# Patient Record
Sex: FEMALE | Race: WHITE | Employment: OTHER | URBAN - METROPOLITAN AREA
[De-identification: names, ages, dates, MRNs, and addresses within clinical notes are randomized per-mention and may not be internally consistent; named-entity substitution may affect disease eponyms.]

---

## 2019-03-20 ENCOUNTER — HOSPITAL ENCOUNTER (OUTPATIENT)
Age: 69
Setting detail: SPECIMEN
Discharge: HOME | End: 2019-03-20
Payer: MEDICARE

## 2019-03-21 LAB — SURGICAL PATHOLOGY REPORT: NORMAL

## 2019-07-26 ENCOUNTER — HOSPITAL ENCOUNTER (OUTPATIENT)
Facility: MEDICAL CENTER | Age: 69
End: 2019-07-26
Payer: MEDICARE

## 2019-07-26 ENCOUNTER — TELEPHONE (OUTPATIENT)
Dept: ONCOLOGY | Age: 69
End: 2019-07-26

## 2019-07-29 ENCOUNTER — TELEPHONE (OUTPATIENT)
Dept: INFUSION THERAPY | Facility: MEDICAL CENTER | Age: 69
End: 2019-07-29

## 2019-07-30 DIAGNOSIS — Z13.29 ENCOUNTER FOR SCREENING FOR OTHER SUSPECTED ENDOCRINE DISORDER: ICD-10-CM

## 2019-08-02 ENCOUNTER — HOSPITAL ENCOUNTER (OUTPATIENT)
Dept: INFUSION THERAPY | Facility: MEDICAL CENTER | Age: 69
Discharge: HOME | End: 2019-08-02
Payer: MEDICARE

## 2019-08-02 VITALS
DIASTOLIC BLOOD PRESSURE: 77 MMHG | TEMPERATURE: 98.1 F | HEART RATE: 63 BPM | RESPIRATION RATE: 18 BRPM | SYSTOLIC BLOOD PRESSURE: 159 MMHG

## 2019-08-02 DIAGNOSIS — Z13.29 ENCOUNTER FOR SCREENING FOR OTHER SUSPECTED ENDOCRINE DISORDER: Primary | ICD-10-CM

## 2019-08-02 LAB
CORTISOL COLLECTION INFO: 30
CORTISOL: 16.9 UG/DL (ref 2.7–18.4)

## 2019-08-02 PROCEDURE — 2580000003 HC RX 258: Performed by: INTERNAL MEDICINE

## 2019-08-02 PROCEDURE — 36415 COLL VENOUS BLD VENIPUNCTURE: CPT

## 2019-08-02 PROCEDURE — 96375 TX/PRO/DX INJ NEW DRUG ADDON: CPT

## 2019-08-02 PROCEDURE — 82533 TOTAL CORTISOL: CPT

## 2019-08-02 PROCEDURE — 96374 THER/PROPH/DIAG INJ IV PUSH: CPT

## 2019-08-02 PROCEDURE — 6360000002 HC RX W HCPCS: Performed by: INTERNAL MEDICINE

## 2019-08-02 RX ADMIN — COSYNTROPIN 1 MCG: 0.25 INJECTION, POWDER, LYOPHILIZED, FOR SOLUTION INTRAMUSCULAR; INTRAVENOUS at 10:41

## 2021-08-03 ENCOUNTER — TELEPHONE (OUTPATIENT)
Dept: ONCOLOGY | Age: 71
End: 2021-08-03

## 2021-08-06 ENCOUNTER — HOSPITAL ENCOUNTER (OUTPATIENT)
Facility: MEDICAL CENTER | Age: 71
End: 2021-08-06
Payer: MEDICARE

## 2021-08-12 ENCOUNTER — INITIAL CONSULT (OUTPATIENT)
Dept: ONCOLOGY | Age: 71
End: 2021-08-12
Payer: MEDICARE

## 2021-08-12 DIAGNOSIS — Z80.0 FAMILY HISTORY- STOMACH CANCER: ICD-10-CM

## 2021-08-12 DIAGNOSIS — Z80.3 FAMILY HISTORY OF BREAST CANCER: ICD-10-CM

## 2021-08-12 DIAGNOSIS — C50.919 MALIGNANT NEOPLASM OF BREAST IN FEMALE, ESTROGEN RECEPTOR POSITIVE, UNSPECIFIED LATERALITY, UNSPECIFIED SITE OF BREAST (HCC): Primary | ICD-10-CM

## 2021-08-12 DIAGNOSIS — Z17.0 MALIGNANT NEOPLASM OF BREAST IN FEMALE, ESTROGEN RECEPTOR POSITIVE, UNSPECIFIED LATERALITY, UNSPECIFIED SITE OF BREAST (HCC): Primary | ICD-10-CM

## 2021-08-12 PROCEDURE — 96040 PR GENETIC COUNSELING, EACH 30 MIN: CPT | Performed by: GENETIC COUNSELOR, MS

## 2021-08-12 NOTE — PROGRESS NOTES
[facility] [place] [facility]   Hereditary Cancer Risk Assessment     Name: Isaiah Martinez   YOB: 1950   Date of Consultation: 8/12/21     Ms. Elayne Hanson was seen at the [address] [place] [address] for genetic counseling on 8/12/21. Ms. Elayne Hanson was referred by JOSE Duran due to her personal and family history of cancer. PERSONAL HISTORY   Ms. Elayne Hanson is a 65 y.o.  female who was recently diagnosed with breast cancer. 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 She meets with her surgeon this coming Monday to discuss treatment recommendations. FAMILY HISTORY  Ms. Elayne Hanson has one daughter (54y). Ms. Elayne Hanson a total of 3 sisters and 3 brothers. One sister passed away at age 29 from diffuse gastric cancer. Her son (Ms. Maico Bunch nephew) was diagnosed with diffuse gastric cancer at age 52 and is currently undergoing treatment. One sister passed way at age [de-identified] and had a prior history of breast cancer diagnosed at an unknown age. Another sister is living at age 79, no cancer. One brother passed away at age 54 from tongue / esophageal cancer. One brother passed away at age 58 from lung cancer. One brother is living, no cancer; however, his daughter passed away at age 29 from complications of gastrectomy after being diagnosed with diffuse gastric cancer at age 32. Several other distant cousins have had early onset breast and gastric cancer. Copies of her relatives' genetic testing confirm that several members carry a hereditary CDH1 gene mutation which causes hereditary diffuse gastric cancer ([address] [place] [address]). Another relative was also found to carry a hereditary CHEK2 gene mutation. Her daughter does not carry either of these mutations, but it is still unclear if Ms. Elayne Hanson carries them herself. Her daughter's testing also revealed an incidental increased risk allele in the APC gene.      CDH1 c.[id number] - NEGATIVE, FAMILIAL MUTATION NOT DETECTED  CHEK2 c.1100delC - NEGATIVE, FAMILIAL MUTATION NOT DETECTED  APC c.3920T>A ([id number]) - POSITIVE, INCREASED RISK ALLELE DETECTED    RISK ASSESSMENT   We discussed that approximately 5-10% of cancers are due to a hereditary gene mutation which causes an increased risk for certain cancers. Hereditary cancers are typically diagnosed at younger ages (under age 46y) and occur in multiple generations of a family. Multiple individuals with the same type of cancer (example: breast or colorectal) or uncommon cancers (example: ovarian, pancreatic, male breast cancer) are also features of hereditary cancers. We discussed that Ms. Ellsworth's personal history is not highly concerning for a hereditary predisposition given that she was diagnosed with an estrogen receptor positive ductal breast cancer over age 46. However, because she has a strong family history of breast and gastric cancer and that there are 3 known hereditary gene mutations in her family, genetic testing is highly recommended. In summary, Ms. Willy Morrissey meets the [address] [place] [address] (NCCN) guidelines for genetic testing of the CDH1, CHEK2 and APC familial mutations. A copy of her relative's test reports confirm the following mutations:   Nephew - CDH1 c.2064_2065del   Maternal first cousin - CHEK2 c.1100delC  Daughter - APC c.3920T>A ([id number])    The NCCN 2.2022 guidelines state that a multi-gene is often the most efficient and cost effective method of genetic testing. An individual's personal and/or family history may be explained by more than one inherited cancer syndrome. Thus, a multi-gene panel may be more efficient, more cost effecting, and increases the yield of detecting a hereditary mutation which would impact medical management. Given her personal and/or family history, we recommend testing for the following genes at minimum: BRCA1/2, HERMAN, PALB2.     DISCUSSION  We discussed that genetic testing is available for multiple genes related to hereditary cancer. Some of these genes are known to carry a significant increased risk for several cancers including colon, breast, uterine, ovarian, stomach, and pancreatic cancer, while some of these genes are believed to have a moderate increased risk for breast and other cancers. We discussed the possibility of finding a mutation in genes with limited information to guide medical management, as well we as the possibility of identifying variants of uncertain significance (VUS). We discussed the risks, benefits, and limitations of genetic testing. Possible test results were discussed as well as potential screening and prevention strategies. Specifically, we discussed increased breast cancer surveillance by mammogram and breast MRI as well as the option of prophylactic mastectomy. We discussed the recommendation for prophylactic oophorectomy for results which suggest an increased risk for ovarian cancer. Lastly, we discussed that the results of Ms. Ellsworth's genetic testing may be beneficial in defining her risk for cancer as well as for her family members. SUMMARY & PLAN  1) Ms. Javier Gaucher meets the NCCN criteria for genetic testing based on her personal diagnosis of breast cancer and that she has a strong family history of hereditary diffuse gastric cancer caused by a CDH1 gene mutation, a strong family history of breast cancer caused by a CHEK2 gene mutation and that her daughter carries a high risk allele in the APC gene. 2) Genetic testing via a multi-gene panel was recommended and offered to Ms. Ellsworth. 3) Ms. Javier Gaucher elected to proceed with the Multi-Cancer gene panel. 4) Ms. Javier Gaucher is aware that she will receive a notification from [employer] if the out of pocket cost for testing exceeds $100 (based on individual insurance plan) and the option to proceed with the self pay price of $249.     5) Informed consent was obtained and a blood sample was sent to [facility].  We will

## 2021-08-24 ENCOUNTER — TELEPHONE (OUTPATIENT)
Dept: RADIATION ONCOLOGY | Facility: MEDICAL CENTER | Age: 71
End: 2021-08-24

## 2021-08-24 NOTE — TELEPHONE ENCOUNTER
Daughter called to change date of consult. See's Dr Siomara Valverde on 9/20/21 and wants both appointments the same day.  Consult changed to 9/20/21 @ 9:30am

## 2021-09-01 ENCOUNTER — TELEPHONE (OUTPATIENT)
Dept: ONCOLOGY | Age: 71
End: 2021-09-01

## 2021-09-01 NOTE — TELEPHONE ENCOUNTER
Spoke with Law Ernst and Obdul[employer] today and notified them of the following from [facility]: \"Thank you for your order, [id number] Eli Rinaldi, 1950). Unfortunately, the specimen we received for your patient does not meet our quality standards. We apologize for the inconvenience, but we will require a new specimen to proceed with testing. Please review the Specimen Requirements page on our website for additional information. If you and your patient would like to send in a new specimen, we will gladly send you or your patient a new collection kit. If additional services, such as mobile phlebotomy, are needed, please let us know. Please note that a new test order does not need to be generated. We will reach out to the patient directly if we do not hear back from you within one week. \"    Patient is requesting mobile phlebotomy service due to limited ability to get to and from appointments. I notified [facility] of the patient's request and directed them to contact daughter Cristal Camacho to schedule date and time for blood draw. Will contact patient with results when available.

## 2021-09-20 ENCOUNTER — HOSPITAL ENCOUNTER (OUTPATIENT)
Dept: RADIATION ONCOLOGY | Facility: MEDICAL CENTER | Age: 71
Discharge: HOME | End: 2021-09-20
Payer: MEDICARE

## 2021-09-20 ENCOUNTER — TELEPHONE (OUTPATIENT)
Dept: RADIATION ONCOLOGY | Facility: MEDICAL CENTER | Age: 71
End: 2021-09-20

## 2021-09-20 VITALS
TEMPERATURE: 97.1 F | SYSTOLIC BLOOD PRESSURE: 142 MMHG | HEIGHT: 66 IN | RESPIRATION RATE: 16 BRPM | WEIGHT: 210.13 LBS | DIASTOLIC BLOOD PRESSURE: 68 MMHG | BODY MASS INDEX: 33.77 KG/M2 | OXYGEN SATURATION: 96 % | HEART RATE: 69 BPM

## 2021-09-20 DIAGNOSIS — Z17.0 MALIGNANT NEOPLASM OF UPPER-OUTER QUADRANT OF LEFT BREAST IN FEMALE, ESTROGEN RECEPTOR POSITIVE (HCC): Primary | ICD-10-CM

## 2021-09-20 DIAGNOSIS — Z17.0 CARCINOMA OF UPPER-OUTER QUADRANT OF LEFT BREAST IN FEMALE, ESTROGEN RECEPTOR POSITIVE (HCC): ICD-10-CM

## 2021-09-20 DIAGNOSIS — C50.412 MALIGNANT NEOPLASM OF UPPER-OUTER QUADRANT OF LEFT BREAST IN FEMALE, ESTROGEN RECEPTOR POSITIVE (HCC): Primary | ICD-10-CM

## 2021-09-20 DIAGNOSIS — C50.412 CARCINOMA OF UPPER-OUTER QUADRANT OF LEFT BREAST IN FEMALE, ESTROGEN RECEPTOR POSITIVE (HCC): ICD-10-CM

## 2021-09-20 DIAGNOSIS — C50.912 MALIGNANT NEOPLASM OF LEFT FEMALE BREAST, UNSPECIFIED ESTROGEN RECEPTOR STATUS, UNSPECIFIED SITE OF BREAST (HCC): Primary | ICD-10-CM

## 2021-09-20 PROCEDURE — 99213 OFFICE O/P EST LOW 20 MIN: CPT | Performed by: STUDENT IN AN ORGANIZED HEALTH CARE EDUCATION/TRAINING PROGRAM

## 2021-09-20 PROCEDURE — 99205 OFFICE O/P NEW HI 60 MIN: CPT | Performed by: STUDENT IN AN ORGANIZED HEALTH CARE EDUCATION/TRAINING PROGRAM

## 2021-09-20 NOTE — PROGRESS NOTES
[facility] SERVICES Radiation Oncology     [id number]   Martin Chan: [phone]  F: [phone]  [email or web address]          RADIATION ONCOLOGY NEW PATIENT VISIT:    Patient: Aura Hutton   YOB: 1950   MRN:  [id number]     Date of Service: 9/20/2021    Referring Provider for today's consult: Shelly Metzger MD    CHIEF COMPLAINT: \"Breast cancer\"    DIAGNOSIS: Aura Hutton is a 65 y.o. female with left upper outer breast pathological anatomic stage IIA ([id number]) invasive ductal carcinoma, G3, ER+/VA+/HER2-, status post left breast lumpectomy and sentinel lymph node biopsy on 8/31/2021. Final pathology revealed a 4.9 cm invasive ductal carcinoma, G3, negative margins, with DCIS present, intermediate nuclear grade, solid and cribriform pattern with necrosis and microcalcification with negative margins. 0/4 lymph nodes positive for carcinoma. Oncotype score pending. HISTORY OF PRESENT ILLNESS:     This is a very pleasant 73-year-old female who presented with a palpable lesion in the left breast.  She underwent imaging at an outside facility which revealed a 2.5 x 3 x 2.5 cm lesion at the 2 to 3 o'clock position, mid depth on the left breast.  Left breast ultrasound confirmed a 2.9 cm irregular mass at the 2:00 middle depth. She underwent an ultrasound-guided left breast biopsy on 7/27/2021 which revealed invasive ductal carcinoma. She then went on to have a left breast lumpectomy and sentinel lymph node biopsy on 8/31/2021 by Dr. Ramiro Nelson. Final pathology revealed a 4.9 cm invasive ductal carcinoma, G3, negative margins, with DCIS present, intermediate nuclear grade, solid and cribriform pattern with necrosis and microcalcification with negative margins. 0/4 lymph nodes positive for carcinoma. Oncotype score pending.     She met with Dr. Siomara Valverde of the [facility] today (medical oncology), who she has been seen for a number of years for blood clots. Patient was also referred to radiation oncology to discuss postoperative management. Current symptoms:    Patient denies breast or axillary masses,skin changes, nipple discharge or inversion, weight loss, or abnormal bone pain. She does report right axillary tail pain which is new for her. This is how she presented with the left breast mass and she is concerned about this. PRIOR RADIATION HISTORY:  No prior history of radiation therapy    PACEMAKER: None    PAST MEDICAL HISTORY:    Uvaldo Bach  has a past medical history of Cancer ([facility] [address] Hypertension, Kidney stones, Oxygen desaturation during sleep, and Pulmonary embolism ([facility] [address] PAST SURGICAL HISTORY:    Uvaldo Bach  has a past surgical history that includes Cholecystectomy; Rotator cuff repair (Bilateral); Lithotripsy; Hysterectomy; Breast surgery; and joint replacement (Right, 2020). MEDICATIONS:  No current outpatient medications on file prior to encounter. No current facility-administered medications on file prior to encounter. ALLERGIES:   Pcn [penicillins]    SOCIAL HISTORY:  Uvaldo Bach  reports that she has quit smoking. She has never used smokeless tobacco. She reports previous alcohol use. She reports previous drug use. FAMILY HISTORY:  Uvaldo Bach family history includes Cancer in her brother, brother, brother, nephew, nephew, nephew, nephew, niece, niece, sister, and sister. PHYSICAL EXAMINATION:    CHAPERONE: Family/friend/ present  ECO Asymptomatic  BP (!) 142/68   Pulse 69   Temp 97.1 °F (36.2 °C) (Temporal)   Resp 16   Ht 5' 6\" (1.676 m)   Wt 210 lb 2 oz (95.3 kg)   SpO2 96% Comment: uses oxygen at night  BMI 33.92 kg/m²   CONSTITUTIONAL: Well developed, well nourished female. Alert and oriented x3. No acute distress. HEENT: Head normocephalic and atraumatic. Extraocular movements intact. Neck supple, non-tender, without cervical or supraclavicular lymphadenopathy. BREAST: Breasts are symmetric. No palpable masses. Mobile mass at the Postsurgical scar noted in the left breast. Nontender. No skin edema or erythema. No nipple inversion or discharge. AXILLA: Postsurgical changes are noted in the left axilla. No palpable LNs are noted B/L. BACK: Non-tender to palpation or percussion; no CVA tenderness. Axial skeleton non-tender to palpation. NEUROLOGIC EXAM: Cranial nerves II through XII grossly intact. No focal neurologic deficit. Speech is fluent. Gait and posture are steady. PSYCHIATRIC:  Appropriate mood and affect for her clinical situation. ASSESSMENT AND PLAN:     I discussed with the patient regarding her diagnosis and explained the rationale for radiation therapy after breast conservation surgery. I described the logistics of radiation therapy from CT simulation through daily treatments. I also reviewed the potential acute side effects which include, but are not limited to, skin redness, peeling, itching, and ulceration; and late side effects which include, but are not limited to, skin thickening/fibrosis, lung scarring that is unlikely to affect breathing, risk for cardiac toxicity, and a small risk of secondary malignancy. Ms. Deb Farley was given the opportunity to ask questions, and all of her questions were answered to her satisfaction. The patient verbalized understanding. Informed consent for treatment was obtained. She will be scheduled for a CT simulation appointment. I will await final Oncotype score prior to initiating radiation treatment, just in case there is a role for adjuvant chemotherapy. Additionally, as she is complaining of right breast pain, I ordered a repeat mammogram of the right breast.  Lastly, the patient reports that her genetic testing is incomplete and she has been family history of malignancies.   I therefore will refer the patient to our nurse navigators to help her complete the genetic testing, as she has already met with her attending counselor. Orders Placed:     Orders Placed This Encounter   Procedures    [facility] [address] [facility] Oncology Nurse Navigator     + CT simulation    CC:    Mark Dumont MD    Total time spent on this case on the day of encounter is more than 80 minutes. This time includes combination of one or more of the following - review of necessary tests, review of pertinent medical records from the EMR, performing medically appropriate examination and evaluation, counseling and educating the patient/family/caregiver, ordering necessary medical tests, procedures etc., documenting the clinical information in the electronic medical record, care coordination, referring and communicating with other health care providers and interpretation of results independently. This note is created with the assistance of a speech recognition program.  While intending to generate a document that actually reflects the content of the visit, the document can still have some errors including those of syntax and sound a like substitutions which may escape proof reading. It such instances, actual meaning can be extrapolated by contextual diversion.

## 2021-09-20 NOTE — PROGRESS NOTES
Referring Physician: Arya    Pt here for consult for breast. Dr Crow Ba met with today. WIll do oncotype. Patient denies pain. Dr Sarah Muñoz to eval. Right breast mammogram ordered due to pain. Will look for oncotype results prior to Sim from Dr. Hue Lucia office. Teach and sim scheduled. Vitals:    21 0957   BP: (!) 142/68   Pulse: 69   Resp: 16   Temp: 97.1 °F (36.2 °C)   SpO2: 96%    :  Patient Currently in Pain: No             Wt Readings from Last 1 Encounters:   21 210 lb 2 oz (95.3 kg)        Body mass index is 33.92 kg/m². Height: 5' 6\" (167.6 cm)         Immunizations:    Influenza status:    [x]   Current   []   Patient declined    Pneumococcal status:  [x]   Current  []   Patient declined  Covid status:   [x]  Dose #1:                     [x]  Dose #2:               []   Patient declined    Smoking Status:    [] Smoker - PPD:   [x] Nonsmoker - Quit Date:               [] Never a smoker      No chief complaint on file. Cancer Staging  No matching staging information was found for the patient. Prior Radiation Therapy? No   If yes, site treated:   Facility:                             Date:    Concurrent Chemo/radiation? No   If yes, start date:    Prior Chemotherapy? No   If yes    Facility:                             Date:    Prior Hormonal Therapy? No   If yes   Facility:                             Date:    Head and Neck Cancer? No   If yes, please remind physician to place swallow study order and speech therapy order. Pacemaker/Defibulator/ICD:  No             BREAST/GYN  Patient only:     LMP:    Age at first Menses:    Para:    :    Cup size:    Lymphedema Evaluation[de-identified]   [] left arm      [] right arm  Location:     Measurement (cm)    Upper Bicep :    Lower Bicep :           No current outpatient medications on file. No current facility-administered medications for this encounter.        Past Medical History:   Diagnosis Date    Cancer [facility])     Hypertension     Kidney stones     Oxygen desaturation during sleep     Pulmonary embolism (HCC)     orginated in knee traveled to lung       Past Surgical History:   Procedure Laterality Date    BREAST SURGERY      CHOLECYSTECTOMY      HYSTERECTOMY      JOINT REPLACEMENT Right 01/13/2020    LITHOTRIPSY      ROTATOR CUFF REPAIR Bilateral        Family History   Problem Relation Age of Onset    Cancer Sister     Cancer Brother     Cancer Brother     Cancer Brother     Cancer Sister     Cancer Niece     Cancer Nephew     Cancer Niece     Cancer Nephew     Cancer Nephew     Cancer Nephew        Social History     Socioeconomic History    Marital status:      Spouse name: Not on file    Number of children: Not on file    Years of education: Not on file    Highest education level: Not on file   Occupational History    Not on file   Tobacco Use    Smoking status: Former Smoker    Smokeless tobacco: Never Used    Tobacco comment: quit 1991   Substance and Sexual Activity    Alcohol use: Not Currently    Drug use: Not Currently    Sexual activity: Not on file   Other Topics Concern    Not on file   Social History Narrative    Not on file     Social Determinants of Health     Financial Resource Strain:     Difficulty of Paying Living Expenses:    Food Insecurity:     Worried About Running Out of Food in the Last Year:     [address] in the Last Year:    Transportation Needs:     Lack of Transportation (Medical):      Lack of Transportation (Non-Medical):    Physical Activity:     Days of Exercise per Week:     Minutes of Exercise per Session:    Stress:     Feeling of Stress :    Social Connections:     Frequency of Communication with Friends and Family:     Frequency of Social Gatherings with Friends and Family:     Attends Worship Services:     Active Member of Clubs or Organizations:     Attends Club or Organization Meetings:     Marital Status:    Intimate Partner Violence:     Fear of Current or Ex-Partner:     Emotionally Abused:     Physically Abused:     Sexually Abused:              FALLS RISK SCREEN  Instructions:  Assess the patient and enter the appropriate indicators that are present for fall risk identification. Total the numbers entered and assign a fall risk score from Table 2.  Reassess patient at a minimum every 12 weeks or with status change. Assessment   Date  9/20/2021     1. Mental Ability: confusion/cognitively impaired 0     2. Elimination Issues: incontinence, frequency 0       3. Ambulatory: use of assistive devices (walker, cane, off-loading devices),        attached to equipment (IV pole, oxygen) 0     4. Sensory Limitations: dizziness, vertigo, impaired vision 0     5. Age less than 65        0     6. Age 72 or greater 0     7. Medication: diuretics, strong analgesics, hypnotics, sedatives,        antihypertensive agents 0   8. Falls:  recent history of falls within the last 3 months (not to include slipping or        tripping) 0   TOTAL 0    If score of 4 or greater was education given? No       TABLE 2   Risk Score Risk Level Plan of Care   0-3 Little or  No Risk 1. Provide assistance as indicated for ambulation activities  2. Reorient confused/cognitively impaired patient  3. Call-light/bell within patient's reach  4. Chair/bed in low position, stretcher/bed with siderails up except when performing patient care activities  5. Educate patient/family/caregiver on falls prevention  6.  Reassess in 12 weeks or with any noted change in patient condition which places them at a risk for a fall   4-6 Moderate Risk 1. Provide assistance as indicated for ambulation activities  2. Reorient confused/cognitively impaired patient  3. Call-light/bell within patient's reach  4. Chair/bed in low position, stretcher/bed with siderails up except when performing patient care activities  5.   Educate patient/family/caregiver on falls prevention     7 or   Higher High Risk 1. Place patient in easily observable treatment room  2. Patient attended at all times by family member or staff  3. Provide assistance as indicated for ambulation activities  4. Reorient confused/cognitively impaired patient  5. Call-light/bell within patient's reach  6. Chair/bed in low position, stretcher/bed with siderails up except when performing patient care activities  7. Educate patient/family/caregiver on falls prevention             Assessment/Plan: Patient was seen today for consultation.          Yvonne Mclean RN 9/20/2021 10:13 AM

## 2021-09-20 NOTE — TELEPHONE ENCOUNTER
Dr Floyce Sandifer ordered mammogram for [facility]. She wants to schedule it herself at [facility].  Copy of order given to patient

## 2021-09-21 RX ORDER — CITALOPRAM 40 MG/1
40 TABLET ORAL DAILY
COMMUNITY

## 2021-09-21 RX ORDER — DIAZEPAM 5 MG/1
5 TABLET ORAL PRN
COMMUNITY

## 2021-09-21 RX ORDER — LOSARTAN POTASSIUM 50 MG/1
50 TABLET ORAL DAILY
COMMUNITY

## 2021-09-21 RX ORDER — ROPINIROLE 1 MG/1
1 TABLET, FILM COATED ORAL 2 TIMES DAILY
COMMUNITY

## 2021-09-21 RX ORDER — OMEPRAZOLE 20 MG/1
20 CAPSULE, DELAYED RELEASE ORAL 2 TIMES DAILY
COMMUNITY

## 2021-09-21 RX ORDER — GABAPENTIN 300 MG/1
300 CAPSULE ORAL PRN
COMMUNITY

## 2021-09-21 RX ORDER — CHOLECALCIFEROL (VITAMIN D3) 1250 MCG
CAPSULE ORAL PRN
COMMUNITY

## 2021-09-21 RX ORDER — FERROUS SULFATE 325(65) MG
325 TABLET ORAL
COMMUNITY

## 2021-09-21 RX ORDER — AMLODIPINE BESYLATE 5 MG/1
5 TABLET ORAL DAILY
COMMUNITY

## 2021-09-21 NOTE — ADDENDUM NOTE
Encounter addended by: Nakia Jung RN on: 9/21/2021 1:46 PM   Actions taken: Order Reconciliation Section accessed, Home Medications modified, Medication List reviewed, Allergies modified, Allergies reviewed

## 2021-09-23 ENCOUNTER — TELEPHONE (OUTPATIENT)
Dept: ONCOLOGY | Age: 71
End: 2021-09-23

## 2021-09-23 ENCOUNTER — CLINICAL DOCUMENTATION (OUTPATIENT)
Dept: RADIATION ONCOLOGY | Facility: MEDICAL CENTER | Age: 71
End: 2021-09-23

## 2021-09-23 NOTE — PROGRESS NOTES
[facility] Nutrition Note  PG-SGA screening tool reviewed with score of 0    Patient Reports:  1. Weight: Not changed (0)  2. Food Intake: Unchanged? (0) did not answer  3. Symptoms: No problems eating (0)  4. Activities and function: Normal with no limitations? (0) did not answer     *Full assessment for scores > 4. Height: 5' 6\" (1.676 m)  as of 9/20/2021   Current Weight: 210 lb 2 oz (95.3 kg)  as of 9/20/2021  BMI: 33.92 kg/m²    Recommend monitoring patient's weight and for potential side effects from CA itself and/or tx.     Anjali Yeung, 64 [address],   Office Number: [phone]

## 2021-09-23 NOTE — TELEPHONE ENCOUNTER
I reached out to patient to introduce myself. Explained that I am a [facility]. I work primarily with the Breast cancer patients in the oncology departments. I am here as a resource and support to you and your family. I am her to answer questions, help coordinate care and help remove barriers of care. I am here as much or as little as you need me. Please feel free to call me as needed. Left my name and contact information. Let patient know I will be sending out some information about [facility]), navigation, and resources to her in the mail. Explained that I work with Dr. Gaby Escobar the radiation oncology doctor. I explained that we are awaiting the oncotype dx results to know what to do next. I explained if no chemotherapy is indicated we will move forward with radiation therapy. If chemotherapy is recommended we would want to do that next with Dr. Reinaldo Hernandez. We also discussed genetic testing. I let her know that according to my genetic counselors note it looks like the specimen that was sent in was not able to be used and we will need to submit another one. I let her know that I would work with [facility] our genetic counselor and see if this could be drawn at her appointment on 9/30/21 at [facility] radiation oncology. Answered questions. Encouraged and wished patient well. Pt on pending.

## 2021-09-27 DIAGNOSIS — C50.912 MALIGNANT NEOPLASM OF LEFT FEMALE BREAST, UNSPECIFIED ESTROGEN RECEPTOR STATUS, UNSPECIFIED SITE OF BREAST (HCC): ICD-10-CM

## 2021-09-28 ENCOUNTER — TELEPHONE (OUTPATIENT)
Dept: ONCOLOGY | Age: 71
End: 2021-09-28

## 2021-09-28 NOTE — TELEPHONE ENCOUNTER
Assisting Jose Hughes, RN Navigator. Per Mariposa's request I called Dr. Lizbet Contreras office to check on oncotype results. I spoke with Magdiel Orantes and she states that it was just ordered on 9/20/21 so it is not back. Heriberto Alvarez updated.

## 2021-09-30 ENCOUNTER — HOSPITAL ENCOUNTER (OUTPATIENT)
Dept: RADIATION ONCOLOGY | Facility: MEDICAL CENTER | Age: 71
Discharge: HOME | End: 2021-09-30
Attending: RADIOLOGY
Payer: MEDICARE

## 2021-09-30 ENCOUNTER — TELEPHONE (OUTPATIENT)
Dept: ONCOLOGY | Age: 71
End: 2021-09-30

## 2021-09-30 ENCOUNTER — TELEPHONE (OUTPATIENT)
Dept: RADIATION ONCOLOGY | Facility: MEDICAL CENTER | Age: 71
End: 2021-09-30

## 2021-09-30 PROCEDURE — 77334 RADIATION TREATMENT AID(S): CPT | Performed by: STUDENT IN AN ORGANIZED HEALTH CARE EDUCATION/TRAINING PROGRAM

## 2021-09-30 PROCEDURE — 77290 THER RAD SIMULAJ FIELD CPLX: CPT | Performed by: STUDENT IN AN ORGANIZED HEALTH CARE EDUCATION/TRAINING PROGRAM

## 2021-09-30 PROCEDURE — 77263 THER RADIOLOGY TX PLNG CPLX: CPT | Performed by: STUDENT IN AN ORGANIZED HEALTH CARE EDUCATION/TRAINING PROGRAM

## 2021-09-30 NOTE — TELEPHONE ENCOUNTER
Per instructions from Dr Shilpi Charles, I called Dr Poonam Pepper office and spoke with Tiffany to see if Oncotype is back yet. She said No, that it was just ordered on 09/20/2021.

## 2021-09-30 NOTE — TELEPHONE ENCOUNTER
Spoke with Aly Garcia she is working on lab draw for patient for genetics. Unable to do at [facility] onc appointment. Aly Garcia is working with family.

## 2021-09-30 NOTE — PROGRESS NOTES
Pt here today for teach and simulation scan, with her daughter, Kanwal Castellano. Radiation and You information provided along with additional education regarding radiation therapy and what to expect. Pt verbalizes understanding and all questions answered to the best of my knowledge. Samples of aquaphor and community resource information provided. Pt escorted to CT room without any difficulty.

## 2021-10-01 ENCOUNTER — TELEPHONE (OUTPATIENT)
Dept: ONCOLOGY | Age: 71
End: 2021-10-01

## 2021-10-01 NOTE — TELEPHONE ENCOUNTER
Mobile phlebotomy draw from [facility] set up for 10/1 at 1pm at patient's home. Second sample will be shipped by phlebotomy service.

## 2021-10-04 ENCOUNTER — TELEPHONE (OUTPATIENT)
Dept: ONCOLOGY | Age: 71
End: 2021-10-04

## 2021-10-04 NOTE — TELEPHONE ENCOUNTER
Called and left message for Eneida Boyle. Reminded her that I am her nurse navigator at [facility] ([facility]). I work with Dr. Valentino Carolina. I believe that our  spoke with Jacquelyn's daughter today. Gisele Antunez know that I am here to answer questions regarding follow up and treatment. I wanted to check to see if she has an appointment with Dr. Keenan Avelar to discuss the results of her oncotype testing and if chemotherapy was going to be offered or if Dr. Keenan Avelar wants her to go on to radiation therapy. The  notified me that Dr. Brianna Daily is no longer in network with her insurance. I let her know if she would like to get set up with a new surgeon for follow up we can assist her or she can follow with med onc or rad onc. What ever works best for her. I noted oncotype dx testing results in epic. Sim and teach for radiation oncology has been done. Offered to assist patient and daughter as needed. Left my name and number. Pt on pending.

## 2021-10-04 NOTE — TELEPHONE ENCOUNTER
SW returned daughter's call. Daughter explained a few concerns she had surrounding her mother's care. States that the surgeon her mother was seeing at [place] [address] recently went to [facility] and that they do not take her mother's insurance. She states that Dr. Griselda Gallo states that she needs to see patient before she starts radiation treatment. NOHEMI advised will let the nurse navigator know about this concern. SW also provided nurse navigator Mariposa's phone number. Daughter also expressed concerns for patient's mental health. Reports that mom has been having bouts of confusion and severe depression and grief but indicates that doctors have focused on cancer treatment at this time. SW advised that when patient comes in for her first treatment that SW will meet with them to discuss needs. NOHEMI called nurse navigator and updated on call.

## 2021-10-05 ENCOUNTER — HOSPITAL ENCOUNTER (OUTPATIENT)
Dept: RADIATION ONCOLOGY | Facility: MEDICAL CENTER | Age: 71
Discharge: HOME | End: 2021-10-05
Attending: RADIOLOGY
Payer: MEDICARE

## 2021-10-05 PROCEDURE — 77300 RADIATION THERAPY DOSE PLAN: CPT | Performed by: STUDENT IN AN ORGANIZED HEALTH CARE EDUCATION/TRAINING PROGRAM

## 2021-10-05 PROCEDURE — 77334 RADIATION TREATMENT AID(S): CPT | Performed by: STUDENT IN AN ORGANIZED HEALTH CARE EDUCATION/TRAINING PROGRAM

## 2021-10-05 PROCEDURE — 77295 3-D RADIOTHERAPY PLAN: CPT | Performed by: STUDENT IN AN ORGANIZED HEALTH CARE EDUCATION/TRAINING PROGRAM

## 2021-10-06 ENCOUNTER — TELEPHONE (OUTPATIENT)
Dept: ONCOLOGY | Age: 71
End: 2021-10-06

## 2021-10-06 NOTE — TELEPHONE ENCOUNTER
Followed up with patient for psychosocial. Left message encouraging a call back. SW monitoring for first rtx appointment to meet with patient and daughter per previous conversation with daughter.

## 2021-10-12 ENCOUNTER — TELEPHONE (OUTPATIENT)
Dept: ONCOLOGY | Age: 71
End: 2021-10-12

## 2021-10-12 ENCOUNTER — HOSPITAL ENCOUNTER (OUTPATIENT)
Dept: RADIATION ONCOLOGY | Facility: MEDICAL CENTER | Age: 71
Discharge: HOME | End: 2021-10-12
Attending: STUDENT IN AN ORGANIZED HEALTH CARE EDUCATION/TRAINING PROGRAM
Payer: MEDICARE

## 2021-10-12 ENCOUNTER — HOSPITAL ENCOUNTER (OUTPATIENT)
Dept: RADIATION ONCOLOGY | Facility: MEDICAL CENTER | Age: 71
Discharge: HOME | End: 2021-10-12
Attending: RADIOLOGY
Payer: MEDICARE

## 2021-10-12 VITALS
DIASTOLIC BLOOD PRESSURE: 77 MMHG | SYSTOLIC BLOOD PRESSURE: 161 MMHG | WEIGHT: 210.38 LBS | OXYGEN SATURATION: 95 % | BODY MASS INDEX: 33.96 KG/M2 | HEART RATE: 77 BPM | RESPIRATION RATE: 16 BRPM | TEMPERATURE: 97 F

## 2021-10-12 DIAGNOSIS — C50.412 CARCINOMA OF UPPER-OUTER QUADRANT OF LEFT BREAST IN FEMALE, ESTROGEN RECEPTOR POSITIVE (HCC): Primary | ICD-10-CM

## 2021-10-12 DIAGNOSIS — Z17.0 CARCINOMA OF UPPER-OUTER QUADRANT OF LEFT BREAST IN FEMALE, ESTROGEN RECEPTOR POSITIVE (HCC): Primary | ICD-10-CM

## 2021-10-12 PROCEDURE — 77280 THER RAD SIMULAJ FIELD SMPL: CPT | Performed by: STUDENT IN AN ORGANIZED HEALTH CARE EDUCATION/TRAINING PROGRAM

## 2021-10-12 PROCEDURE — 77387 GUIDANCE FOR RADJ TX DLVR: CPT | Performed by: STUDENT IN AN ORGANIZED HEALTH CARE EDUCATION/TRAINING PROGRAM

## 2021-10-12 PROCEDURE — 77412 RADIATION TX DELIVERY LVL 3: CPT | Performed by: STUDENT IN AN ORGANIZED HEALTH CARE EDUCATION/TRAINING PROGRAM

## 2021-10-12 PROCEDURE — G6002 STEREOSCOPIC X-RAY GUIDANCE: HCPCS | Performed by: STUDENT IN AN ORGANIZED HEALTH CARE EDUCATION/TRAINING PROGRAM

## 2021-10-12 NOTE — PROGRESS NOTES
Erin Rides  10/12/2021  Wt Readings from Last 3 Encounters:   10/12/21 210 lb 6 oz (95.4 kg)   09/20/21 210 lb 2 oz (95.3 kg)     Body mass index is 33.96 kg/m². Pt here for OTV. Patient denies any issues. Dr Sandro Leon to eval.    Treatment Area:left breast    Patient was seen today for weekly visit. Comfort Alteration    Fatigue: None    Nutritional Alteration  Anorexia: No   Nausea: No   Vomiting: No     Mucous Membrane Alteration  Drainage: No  Lymphedema: No    Skin Alteration   Sensation:none    Radiation Dermatitis:  Intact [x]     Erythema  []     Discoloration  []     Rash []     Dry desquamation  []     Moist desquamation []       Emotional  Coping: effective      Injury, potential bleeding or infection: none    No results found for: WBC, PLT      BP (!) 161/77   Pulse 77   Temp 97 °F (36.1 °C) (Temporal)   Resp 16   Wt 210 lb 6 oz (95.4 kg)   SpO2 95%   BMI 33.96 kg/m²   Patient Currently in Pain: No                 Assessment/Plan: Patient was seen today for weekly visit.       Jennifer Townsend RN

## 2021-10-12 NOTE — PROGRESS NOTES
Radiation Oncology On-Treatment Visit:     Fraction #  (2.67 Gy)    Diagnosis:  Ms. Galina Madrid is a 66 y.o. female with left upper outer breast pathological anatomic stage IIA ([id number]) invasive ductal carcinoma, G3, ER+/FL+/HER2-, status post left breast lumpectomy and sentinel lymph node biopsy on 2021.     Final pathology revealed a 4.9 cm invasive ductal carcinoma, G3, negative margins, with DCIS present, intermediate nuclear grade, solid and cribriform pattern with necrosis and microcalcification with negative margins. 0/4 lymph nodes positive for carcinoma. Oncotype score 22. Treatment course: Post-operative radiation to the left breast    Progress note:  Ms. Galina Madrid was seen and evaluated. Patient has not noticed any notable side effects of radiation (started treatment today). Physical exam:   VITAL SIGNS: BP (!) 161/77   Pulse 77   Temp 97 °F (36.1 °C) (Temporal)   Resp 16   Wt 210 lb 6 oz (95.4 kg)   SpO2 95%   BMI 33.96 kg/m²   ECO Symptomatic but completely ambulatory   Breast: Deferred. Plan:  · Continue radiation therapy as planned. · Routine skin care advice given to the patient. · I have checked the imaging performed to date, which confirm appropriate positioning.

## 2021-10-12 NOTE — TELEPHONE ENCOUNTER
NOHEMI received consult from [address] [place] [address] stating that patient wants a call regarding a breast surgeon. SW left message encouraging patient to call back. NOHEMI advised will also be at appointment tomorrow to meet with patient.

## 2021-10-13 ENCOUNTER — TELEPHONE (OUTPATIENT)
Dept: ONCOLOGY | Age: 71
End: 2021-10-13

## 2021-10-13 ENCOUNTER — HOSPITAL ENCOUNTER (OUTPATIENT)
Dept: RADIATION ONCOLOGY | Facility: MEDICAL CENTER | Age: 71
Discharge: HOME | End: 2021-10-13
Attending: STUDENT IN AN ORGANIZED HEALTH CARE EDUCATION/TRAINING PROGRAM
Payer: MEDICARE

## 2021-10-13 PROCEDURE — G6002 STEREOSCOPIC X-RAY GUIDANCE: HCPCS | Performed by: STUDENT IN AN ORGANIZED HEALTH CARE EDUCATION/TRAINING PROGRAM

## 2021-10-13 PROCEDURE — 77412 RADIATION TX DELIVERY LVL 3: CPT | Performed by: STUDENT IN AN ORGANIZED HEALTH CARE EDUCATION/TRAINING PROGRAM

## 2021-10-13 PROCEDURE — 77387 GUIDANCE FOR RADJ TX DLVR: CPT | Performed by: STUDENT IN AN ORGANIZED HEALTH CARE EDUCATION/TRAINING PROGRAM

## 2021-10-14 ENCOUNTER — TELEPHONE (OUTPATIENT)
Dept: ONCOLOGY | Age: 71
End: 2021-10-14

## 2021-10-14 ENCOUNTER — HOSPITAL ENCOUNTER (OUTPATIENT)
Dept: RADIATION ONCOLOGY | Facility: MEDICAL CENTER | Age: 71
Discharge: HOME | End: 2021-10-14
Attending: STUDENT IN AN ORGANIZED HEALTH CARE EDUCATION/TRAINING PROGRAM
Payer: MEDICARE

## 2021-10-14 PROCEDURE — 77334 RADIATION TREATMENT AID(S): CPT | Performed by: STUDENT IN AN ORGANIZED HEALTH CARE EDUCATION/TRAINING PROGRAM

## 2021-10-14 PROCEDURE — G6002 STEREOSCOPIC X-RAY GUIDANCE: HCPCS | Performed by: STUDENT IN AN ORGANIZED HEALTH CARE EDUCATION/TRAINING PROGRAM

## 2021-10-14 PROCEDURE — 77300 RADIATION THERAPY DOSE PLAN: CPT | Performed by: STUDENT IN AN ORGANIZED HEALTH CARE EDUCATION/TRAINING PROGRAM

## 2021-10-14 PROCEDURE — 77412 RADIATION TX DELIVERY LVL 3: CPT | Performed by: STUDENT IN AN ORGANIZED HEALTH CARE EDUCATION/TRAINING PROGRAM

## 2021-10-14 PROCEDURE — 77295 3-D RADIOTHERAPY PLAN: CPT | Performed by: STUDENT IN AN ORGANIZED HEALTH CARE EDUCATION/TRAINING PROGRAM

## 2021-10-14 PROCEDURE — 77387 GUIDANCE FOR RADJ TX DLVR: CPT | Performed by: STUDENT IN AN ORGANIZED HEALTH CARE EDUCATION/TRAINING PROGRAM

## 2021-10-14 NOTE — TELEPHONE ENCOUNTER
NOHEMI called patient's daughter to discuss patient driving herself to appointments. SW left message encouraging daughter to call back when she is able. SW received call back from patient's daughter. Advised provider ok with her waiting in the waiting room before driving home. States that on Tuesday she will come in with her mom because patient sees the provider and she wants to present for that. States that patient will drive the daughter to and from the appointment and then patient will provider her own transportation on Wednesday. Daughter concerned about patient being alone in the afternoon as she get weaker with treatment. NOHEMI advised patient is eligible for [employer] which could provider some home health services. States SW can talk to her about it on Tuesday.

## 2021-10-14 NOTE — PROGRESS NOTES
Nutrition Brief: Wt stable    Wt Readings from Last 5 Encounters:   10/12/21 210 lb 6 oz (95.4 kg)   09/20/21 210 lb 2 oz (95.3 kg)        Evaluation:  Wt assessed from 9/20-10/12: stable. No PG-SGA document filled out. No fatigue reported. No change in nutrition alterations. Will reassess next week. Recommendations:  1.  Will continue to monitor change in weights and nutrition alterations      Debi Arauz RD, LD  Office Number: [phone]

## 2021-10-15 ENCOUNTER — HOSPITAL ENCOUNTER (OUTPATIENT)
Dept: RADIATION ONCOLOGY | Facility: MEDICAL CENTER | Age: 71
Discharge: HOME | End: 2021-10-15
Attending: STUDENT IN AN ORGANIZED HEALTH CARE EDUCATION/TRAINING PROGRAM
Payer: MEDICARE

## 2021-10-15 PROCEDURE — 77412 RADIATION TX DELIVERY LVL 3: CPT | Performed by: STUDENT IN AN ORGANIZED HEALTH CARE EDUCATION/TRAINING PROGRAM

## 2021-10-15 PROCEDURE — 77387 GUIDANCE FOR RADJ TX DLVR: CPT | Performed by: STUDENT IN AN ORGANIZED HEALTH CARE EDUCATION/TRAINING PROGRAM

## 2021-10-15 PROCEDURE — G6002 STEREOSCOPIC X-RAY GUIDANCE: HCPCS | Performed by: STUDENT IN AN ORGANIZED HEALTH CARE EDUCATION/TRAINING PROGRAM

## 2021-10-18 ENCOUNTER — HOSPITAL ENCOUNTER (OUTPATIENT)
Dept: RADIATION ONCOLOGY | Facility: MEDICAL CENTER | Age: 71
Discharge: HOME | End: 2021-10-18
Attending: RADIOLOGY
Payer: MEDICARE

## 2021-10-18 PROCEDURE — 77427 RADIATION TX MANAGEMENT X5: CPT | Performed by: STUDENT IN AN ORGANIZED HEALTH CARE EDUCATION/TRAINING PROGRAM

## 2021-10-18 PROCEDURE — G6002 STEREOSCOPIC X-RAY GUIDANCE: HCPCS | Performed by: RADIOLOGY

## 2021-10-18 PROCEDURE — 77387 GUIDANCE FOR RADJ TX DLVR: CPT | Performed by: RADIOLOGY

## 2021-10-18 PROCEDURE — 77412 RADIATION TX DELIVERY LVL 3: CPT | Performed by: RADIOLOGY

## 2021-10-19 ENCOUNTER — HOSPITAL ENCOUNTER (OUTPATIENT)
Dept: RADIATION ONCOLOGY | Facility: MEDICAL CENTER | Age: 71
Discharge: HOME | End: 2021-10-19
Attending: RADIOLOGY
Payer: MEDICARE

## 2021-10-19 VITALS
SYSTOLIC BLOOD PRESSURE: 150 MMHG | WEIGHT: 211 LBS | TEMPERATURE: 97.1 F | DIASTOLIC BLOOD PRESSURE: 67 MMHG | OXYGEN SATURATION: 95 % | RESPIRATION RATE: 15 BRPM | BODY MASS INDEX: 34.06 KG/M2

## 2021-10-19 PROCEDURE — 77336 RADIATION PHYSICS CONSULT: CPT | Performed by: STUDENT IN AN ORGANIZED HEALTH CARE EDUCATION/TRAINING PROGRAM

## 2021-10-19 PROCEDURE — 77387 GUIDANCE FOR RADJ TX DLVR: CPT | Performed by: RADIOLOGY

## 2021-10-19 PROCEDURE — G6002 STEREOSCOPIC X-RAY GUIDANCE: HCPCS | Performed by: RADIOLOGY

## 2021-10-19 PROCEDURE — 77412 RADIATION TX DELIVERY LVL 3: CPT | Performed by: RADIOLOGY

## 2021-10-19 NOTE — PROGRESS NOTES
[address] [facility], [address] [place] [address]  Fax [phone] [address] [facility] WEEKLY PROGRESS NOTE  Patient ID:   Latanya Mendez  : 1950   MRN: [id number]    DIAGNOSIS:  Cancer Staging  No matching staging information was found for the patient. TREATMENT DETAILS:  Treatment Site: left breast  Actual Dose: 1335cGy  Total Planned Dose: 4005cGy  Treatment Technique: 3D-CRT  Fraction Technique: Daily  Therapy imaging monitoring: KV match  Concurrent Chemotherapy: none    SUBJECTIVE:   Patient seen for their weekly on treatment evaluation today. Some fatigue above baseline but no major skin irritation. Dealing with a lot of things outside of radiation for her breast. Some nausea past day or so but doesn't want any medication for it. OBJECTIVE:   CHAPERONE: Not Required    ECO Asymptomatic    VITAL SIGNS: There were no vitals taken for this visit. Wt Readings from Last 5 Encounters:   10/12/21 210 lb 6 oz (95.4 kg)   21 210 lb 2 oz (95.3 kg)     GENERAL:  General appearance is that of a well-nourished, well-developed in no apparent distress. HEENT: Normocephalic, atraumatic, EOMI, moist mucosa, no erythema. Indirect exam .  NECK:  No adenopathy or a palpable thyroid mass, trachea is midline. LYMPHATICS: No cervical, supraclavicular, or axillary adenopathy. HEART:  Normal rate and regular rhythm, normal heart sounds. LUNGS:  Pulmonary effort normal. Normal breath sounds. ABDOMEN:  Soft, nontender, non distended, and no hepatosplenomegaly. EXTREMITIES:  No edema. No calf tenderness. MSK:  No spinal tenderness. Normal ROM. NEUROLOGICAL: Alert and oriented. Strength and sensation intact bilaterally. No focal deficits. PSYCH: Mood normal, behavior normal.  SKIN: No erythema, no desquamation.     LABS:  No results found for: WBC, NEUTROABS, HGB, PLT, CREATININE  No results found for: , CEA  No results found for: PSA    MEDICATIONS:    Current Outpatient Medications:     amLODIPine (NORVASC) 5 MG tablet, Take 5 mg by mouth daily, Disp: , Rfl:     citalopram (CELEXA) 40 MG tablet, Take 40 mg by mouth daily, Disp: , Rfl:     apixaban (ELIQUIS) 5 MG TABS tablet, Take 5 mg by mouth 2 times daily, Disp: , Rfl:     ferrous sulfate (IRON 325) 325 (65 Fe) MG tablet, Take 325 mg by mouth daily (with breakfast), Disp: , Rfl:     losartan (COZAAR) 50 MG tablet, Take 50 mg by mouth daily, Disp: , Rfl:     omeprazole (PRILOSEC) 20 MG delayed release capsule, Take 20 mg by mouth as needed, Disp: , Rfl:     rOPINIRole (REQUIP) 1 MG tablet, Take 1 mg by mouth 2 times daily, Disp: , Rfl:     diazePAM (VALIUM) 5 MG tablet, Take 5 mg by mouth as needed for Anxiety. Indications: 60 mins prior to injection therapy, Disp: , Rfl:     gabapentin (NEURONTIN) 300 MG capsule, Take 300 mg by mouth as needed. , Disp: , Rfl:     Cholecalciferol (VITAMIN D3) 1.25 MG (22443 UT) CAPS, Take by mouth as needed, Disp: , Rfl:       ASSESSMENT PLAN:     Continue radiation. Treatment setup and plan reviewed. Port images/CBCT images reviewed. Appropriate laboratory work was reviewed. Treatment side effects and toxicities reviewed with the patient, and appropriate management was advised. Will continue radiation treatment as planned, and recommend patient contact us if they have any questions or concerns. Electronically signed by Job Oconnor MD on 10/19/2021 at 8:31 AM      Drugs Prescribed:  New Prescriptions    No medications on file       Other Orders Placed:  No orders of the defined types were placed in this encounter.

## 2021-10-19 NOTE — PROGRESS NOTES
Maikel Sterling  10/19/2021  Wt Readings from Last 3 Encounters:   10/19/21 211 lb (95.7 kg)   10/12/21 210 lb 6 oz (95.4 kg)   09/20/21 210 lb 2 oz (95.3 kg)     Body mass index is 34.06 kg/m². PT here for OTV. Patient states some fatigue and last two to three days mild nausea. Dr Anil Pacheco to [facility].    Treatment Area:left breast    Patient was seen today for weekly visit. Comfort Alteration    Fatigue: Mild    Nutritional Alteration  Anorexia: yes   Nausea: yes  Vomiting: No     Mucous Membrane Alteration  Drainage: No  Lymphedema: No    Skin Alteration   Sensation:none    Radiation Dermatitis:  Intact [x]     Erythema  []     Discoloration  []     Rash []     Dry desquamation  []     Moist desquamation []       Emotional  Coping: effective      Injury, potential bleeding or infection: none    No results found for: WBC, PLT      BP (!) 150/67   Temp 97.1 °F (36.2 °C) (Temporal)   Resp 15   Wt 211 lb (95.7 kg)   SpO2 95%   BMI 34.06 kg/m²   Patient Currently in Pain: No                 Assessment/Plan: Patient was seen today for weekly visit.       Emilie Hannah RN

## 2021-10-20 ENCOUNTER — HOSPITAL ENCOUNTER (OUTPATIENT)
Dept: RADIATION ONCOLOGY | Facility: MEDICAL CENTER | Age: 71
Discharge: HOME | End: 2021-10-20
Attending: RADIOLOGY
Payer: MEDICARE

## 2021-10-20 ENCOUNTER — TELEPHONE (OUTPATIENT)
Dept: ONCOLOGY | Age: 71
End: 2021-10-20

## 2021-10-20 PROCEDURE — 77412 RADIATION TX DELIVERY LVL 3: CPT | Performed by: RADIOLOGY

## 2021-10-20 PROCEDURE — 77387 GUIDANCE FOR RADJ TX DLVR: CPT | Performed by: RADIOLOGY

## 2021-10-20 PROCEDURE — G6002 STEREOSCOPIC X-RAY GUIDANCE: HCPCS | Performed by: RADIOLOGY

## 2021-10-20 NOTE — TELEPHONE ENCOUNTER
RECEIVED VM FROM Juan Pablo Grant, [address] @ 1 [phone]. [facility] IS REQUESTING THE AGE PT WAS AT TIME OF DX AND FAMILY HISTORY OF CANCER INFORMATION R/T COVERAGE FOR BRACA TESTING THAT WAS DONE. FAXED GENETICS NOTE TO 1 [phone] W/ CONFIRMATION.

## 2021-10-21 ENCOUNTER — HOSPITAL ENCOUNTER (OUTPATIENT)
Dept: RADIATION ONCOLOGY | Facility: MEDICAL CENTER | Age: 71
Discharge: HOME | End: 2021-10-21
Attending: RADIOLOGY
Payer: MEDICARE

## 2021-10-21 PROCEDURE — G6002 STEREOSCOPIC X-RAY GUIDANCE: HCPCS | Performed by: RADIOLOGY

## 2021-10-21 PROCEDURE — 77412 RADIATION TX DELIVERY LVL 3: CPT | Performed by: RADIOLOGY

## 2021-10-21 PROCEDURE — 77387 GUIDANCE FOR RADJ TX DLVR: CPT | Performed by: RADIOLOGY

## 2021-10-21 NOTE — PROGRESS NOTES
Nutrition Brief: Wt loss trigger    Wt Readings from Last 5 Encounters:   10/19/21 211 lb (95.7 kg)   10/12/21 210 lb 6 oz (95.4 kg)   09/20/21 210 lb 2 oz (95.3 kg)        Evaluation:  Wts assessed 10/12 to 10/19: wt stable  Mild fatigue reported. Reports anorexia and nausea. Patient seen regarding change in nutr alterations- she reports eating \"too much\". States she feels nauseous if she does not have food in her stomach. She has nausea all the time. She is starting to notice loss of taste. Provided education on Nausea and vomiting nutrition therapy, poor appetite, and encouraged protein intake and boost or ensure as needed. Will reassess next week. Recommendations:  1.  Will continue to monitor change in weights and nutrition alterations      Junior Evin RD, FANI  Office Number: [phone]

## 2021-10-22 ENCOUNTER — HOSPITAL ENCOUNTER (OUTPATIENT)
Dept: RADIATION ONCOLOGY | Facility: MEDICAL CENTER | Age: 71
Discharge: HOME | End: 2021-10-22
Attending: RADIOLOGY
Payer: MEDICARE

## 2021-10-22 PROCEDURE — G6002 STEREOSCOPIC X-RAY GUIDANCE: HCPCS | Performed by: RADIOLOGY

## 2021-10-22 PROCEDURE — 77412 RADIATION TX DELIVERY LVL 3: CPT | Performed by: RADIOLOGY

## 2021-10-22 PROCEDURE — 77387 GUIDANCE FOR RADJ TX DLVR: CPT | Performed by: RADIOLOGY

## 2021-10-25 ENCOUNTER — HOSPITAL ENCOUNTER (OUTPATIENT)
Dept: RADIATION ONCOLOGY | Facility: MEDICAL CENTER | Age: 71
Discharge: HOME | End: 2021-10-25
Attending: STUDENT IN AN ORGANIZED HEALTH CARE EDUCATION/TRAINING PROGRAM
Payer: MEDICARE

## 2021-10-25 PROCEDURE — 77427 RADIATION TX MANAGEMENT X5: CPT | Performed by: RADIOLOGY

## 2021-10-25 PROCEDURE — 77412 RADIATION TX DELIVERY LVL 3: CPT | Performed by: STUDENT IN AN ORGANIZED HEALTH CARE EDUCATION/TRAINING PROGRAM

## 2021-10-25 PROCEDURE — 77387 GUIDANCE FOR RADJ TX DLVR: CPT | Performed by: STUDENT IN AN ORGANIZED HEALTH CARE EDUCATION/TRAINING PROGRAM

## 2021-10-25 PROCEDURE — G6002 STEREOSCOPIC X-RAY GUIDANCE: HCPCS | Performed by: STUDENT IN AN ORGANIZED HEALTH CARE EDUCATION/TRAINING PROGRAM

## 2021-10-26 ENCOUNTER — HOSPITAL ENCOUNTER (OUTPATIENT)
Dept: RADIATION ONCOLOGY | Facility: MEDICAL CENTER | Age: 71
Discharge: HOME | End: 2021-10-26
Attending: RADIOLOGY
Payer: MEDICARE

## 2021-10-26 ENCOUNTER — HOSPITAL ENCOUNTER (OUTPATIENT)
Dept: RADIATION ONCOLOGY | Facility: MEDICAL CENTER | Age: 71
Discharge: HOME | End: 2021-10-26
Attending: STUDENT IN AN ORGANIZED HEALTH CARE EDUCATION/TRAINING PROGRAM
Payer: MEDICARE

## 2021-10-26 VITALS
RESPIRATION RATE: 16 BRPM | OXYGEN SATURATION: 94 % | SYSTOLIC BLOOD PRESSURE: 129 MMHG | DIASTOLIC BLOOD PRESSURE: 61 MMHG | BODY MASS INDEX: 34.14 KG/M2 | HEART RATE: 69 BPM | WEIGHT: 211.5 LBS | TEMPERATURE: 96.6 F

## 2021-10-26 DIAGNOSIS — C50.412 CARCINOMA OF UPPER-OUTER QUADRANT OF LEFT BREAST IN FEMALE, ESTROGEN RECEPTOR POSITIVE (HCC): Primary | ICD-10-CM

## 2021-10-26 DIAGNOSIS — Z17.0 CARCINOMA OF UPPER-OUTER QUADRANT OF LEFT BREAST IN FEMALE, ESTROGEN RECEPTOR POSITIVE (HCC): Primary | ICD-10-CM

## 2021-10-26 PROCEDURE — 77336 RADIATION PHYSICS CONSULT: CPT | Performed by: RADIOLOGY

## 2021-10-26 PROCEDURE — 77412 RADIATION TX DELIVERY LVL 3: CPT | Performed by: STUDENT IN AN ORGANIZED HEALTH CARE EDUCATION/TRAINING PROGRAM

## 2021-10-26 PROCEDURE — 77417 THER RADIOLOGY PORT IMAGE(S): CPT | Performed by: STUDENT IN AN ORGANIZED HEALTH CARE EDUCATION/TRAINING PROGRAM

## 2021-10-26 NOTE — PROGRESS NOTES
Radiation Oncology On-Treatment Visit:     Fraction # 11  (29.37 Gy)    Diagnosis:  Ms. Ifeanyi Morris is a 66 y.o. female with left upper outer breast pathological anatomic stage IIA ([id number]) invasive ductal carcinoma, G3, ER+/NE+/HER2-, status post left breast lumpectomy and sentinel lymph node biopsy on 2021.     Final pathology revealed a 4.9 cm invasive ductal carcinoma, G3, negative margins, with DCIS present, intermediate nuclear grade, solid and cribriform pattern with necrosis and microcalcification with negative margins. 0/4 lymph nodes positive for carcinoma. Oncotype score 22. Treatment course: Post-operative radiation to the left breast    Progress note:  Ms. Ifeanyi Morris was seen and evaluated. Patient is moisturizing the skin over her breast. She has minimal fatigue, no complaints. Physical exam:   VITAL SIGNS: /61   Pulse 69   Temp 96.6 °F (35.9 °C) (Temporal)   Resp 16   Wt 211 lb 8 oz (95.9 kg)   SpO2 94%   BMI 34.14 kg/m²   ECO Symptomatic but completely ambulatory   Breast: Mild erythema over left breast, no desquamation. Plan:  · Continue radiation therapy as planned. · I have checked the imaging performed to date, which confirm appropriate positioning.

## 2021-10-26 NOTE — PROGRESS NOTES
Monica Bal  10/26/2021  Wt Readings from Last 3 Encounters:   10/26/21 211 lb 8 oz (95.9 kg)   10/19/21 211 lb (95.7 kg)   10/12/21 210 lb 6 oz (95.4 kg)     Body mass index is 34.14 kg/m². PT here for OTV. Patient denies any new problems. Dr Suleman Henson to [facility].    Treatment Area:breast    Patient was seen today for weekly visit. Comfort Alteration    Fatigue: Mild    Nutritional Alteration  Anorexia: No   Nausea: No   Vomiting: No     Mucous Membrane Alteration  Drainage: No  Lymphedema: No    Skin Alteration   Sensation:none    Radiation Dermatitis:  Intact [x]     Erythema  []     Discoloration  []     Rash []     Dry desquamation  []     Moist desquamation []       Emotional  Coping: effective      Injury, potential bleeding or infection: none    No results found for: WBC, PLT      /61   Pulse 69   Temp 96.6 °F (35.9 °C) (Temporal)   Resp 16   Wt 211 lb 8 oz (95.9 kg)   SpO2 94%   BMI 34.14 kg/m²   Patient Currently in Pain: No                 Assessment/Plan: Patient was seen today for weekly visit.       Lenka Aquino RN

## 2021-10-27 ENCOUNTER — HOSPITAL ENCOUNTER (OUTPATIENT)
Dept: RADIATION ONCOLOGY | Facility: MEDICAL CENTER | Age: 71
Discharge: HOME | End: 2021-10-27
Attending: STUDENT IN AN ORGANIZED HEALTH CARE EDUCATION/TRAINING PROGRAM
Payer: MEDICARE

## 2021-10-27 PROCEDURE — 77412 RADIATION TX DELIVERY LVL 3: CPT | Performed by: STUDENT IN AN ORGANIZED HEALTH CARE EDUCATION/TRAINING PROGRAM

## 2021-10-27 PROCEDURE — 77387 GUIDANCE FOR RADJ TX DLVR: CPT | Performed by: STUDENT IN AN ORGANIZED HEALTH CARE EDUCATION/TRAINING PROGRAM

## 2021-10-27 PROCEDURE — G6002 STEREOSCOPIC X-RAY GUIDANCE: HCPCS | Performed by: STUDENT IN AN ORGANIZED HEALTH CARE EDUCATION/TRAINING PROGRAM

## 2021-10-28 ENCOUNTER — HOSPITAL ENCOUNTER (OUTPATIENT)
Dept: RADIATION ONCOLOGY | Facility: MEDICAL CENTER | Age: 71
Discharge: HOME | End: 2021-10-28
Attending: STUDENT IN AN ORGANIZED HEALTH CARE EDUCATION/TRAINING PROGRAM
Payer: MEDICARE

## 2021-10-28 PROCEDURE — G6002 STEREOSCOPIC X-RAY GUIDANCE: HCPCS | Performed by: STUDENT IN AN ORGANIZED HEALTH CARE EDUCATION/TRAINING PROGRAM

## 2021-10-28 PROCEDURE — 77387 GUIDANCE FOR RADJ TX DLVR: CPT | Performed by: STUDENT IN AN ORGANIZED HEALTH CARE EDUCATION/TRAINING PROGRAM

## 2021-10-28 PROCEDURE — 77412 RADIATION TX DELIVERY LVL 3: CPT | Performed by: STUDENT IN AN ORGANIZED HEALTH CARE EDUCATION/TRAINING PROGRAM

## 2021-10-28 NOTE — PROGRESS NOTES
Nutrition Brief: Wt stable    Wt Readings from Last 5 Encounters:   10/26/21 211 lb 8 oz (95.9 kg)   10/19/21 211 lb (95.7 kg)   10/12/21 210 lb 6 oz (95.4 kg)   09/20/21 210 lb 2 oz (95.3 kg)        Evaluation:  Wts assessed 10/19 to 10/26: weight stable  Mild fatigue reported. No change in nutrition alterations. No N/V  Previously educated: N/V Nutrition Therapy, Poor Appetite, and ONS recommendations    Recommendations:  1.  Will continue to monitor weights and change in nutrition alterations      Shahnaz Cole RD, LD  Office Number: [phone]

## 2021-10-29 ENCOUNTER — HOSPITAL ENCOUNTER (OUTPATIENT)
Dept: RADIATION ONCOLOGY | Facility: MEDICAL CENTER | Age: 71
Discharge: HOME | End: 2021-10-29
Attending: STUDENT IN AN ORGANIZED HEALTH CARE EDUCATION/TRAINING PROGRAM
Payer: MEDICARE

## 2021-10-29 ENCOUNTER — TELEPHONE (OUTPATIENT)
Dept: ONCOLOGY | Age: 71
End: 2021-10-29

## 2021-10-29 PROCEDURE — 77412 RADIATION TX DELIVERY LVL 3: CPT | Performed by: STUDENT IN AN ORGANIZED HEALTH CARE EDUCATION/TRAINING PROGRAM

## 2021-10-29 PROCEDURE — 77387 GUIDANCE FOR RADJ TX DLVR: CPT | Performed by: STUDENT IN AN ORGANIZED HEALTH CARE EDUCATION/TRAINING PROGRAM

## 2021-10-29 PROCEDURE — G6002 STEREOSCOPIC X-RAY GUIDANCE: HCPCS | Performed by: STUDENT IN AN ORGANIZED HEALTH CARE EDUCATION/TRAINING PROGRAM

## 2021-10-29 NOTE — TELEPHONE ENCOUNTER
3 [facility]   Hereditary Cancer Risk Assessment     Name: Gabriela Monaco  YOB: 1950  Date of Results Disclosure: 10/29/21     HISTORY   Ms. Trace Patrciia was seen for genetic counseling at the request of Lily Crigler, APRN-CNP due to her personal and family history of cancer. At that time, Ms. Trace Patricia chose to pursue genetic testing via the Multi-Cancer gene panel. These results were discussed with Ms. Trace Patricia via telephone. A summary of Ms. Ellsworth's results and recommendations are below. RESULTS  [facility] Multi-Cancer Panel: NEGATIVE - NO CLINICALLY SIGNIFICANT MUTATIONS DETECTED   This panel included the analysis of 84 genes associated with hereditary cancer including: AIP, ALK, APC, HERMAN, AXIN2, BAP1, BARD1, BLM, BMPR1A, BRCA1, BRCA2, BRIP1, CASR, CDC73, CDH1, CDK4, CDKN1B, CDKN1C, CDKN2A, CEBPA, CHEK2, CTNNA1, DICER1, DIS3L2, EGFR, EPCAM, FH, FLCN, GATA2, GPC3, GREM1, HOXB13, HRAS, KIT, MAX, MEN1, MET, MITF, MLH1, MSH2, MSH3, MSH6, MUTYH, NBN, NF1, NF2, NTHL1, PALB2, PDGFRA, PHOX2B, PMS2, POLD1, POLE, POT1, QDIED1M, PTCH1, PTEN, RAD50, RAD51C, RAD51D, RB1, RECQL4, RET, RUNX1, SDHA, SDHAF2, SDHB, SDHC, SDHD, SMAD4, SMARCA4, SMARCB1, SMARCE1, STK11, SUFU, TERC, TERT, OKGD391,  TP53, TSC1, TSC2, VHL, WRN, WT1. Please refer to genetic test report for technical details. We discussed that Ms. Ellsworth's result confirms that she does not carry the familial mutations previously detected in her family. This includes the [id number] mutation in the CDH1 gene and the c.1100delC mutation in the CHEK2 gene. She also does not carry the c.3920T>A increased risk allele with the APC gene. Her result also confirms that she does not carry any other hereditary gene mutations in the other genes analyzed. This greatly reduces the likelihood that her personal history of cancer is due to a hereditary mutation.   It is possible that her personal history of cancer may be due to a gene for which testing was not performed or which has yet to be discovered. RECOMMENDATIONS  1) The outcome of Ms. Ellsworth's genetic test results do not affect her current cancer treatment. Ms. Elizabeth Snyder should continue cancer treatment and surveillance as directed by her physicians. 2) Ms. Elizabeth Snyder should continue general population cancer screening guidelines as directed by her physicians. RECOMMENDATIONS FOR FAMILY MEMBERS   1) Genetic testing for Ms. Ellsworth's remaining relatives is still recommended to confirm their carrier status for the known CDH1 and CHEK2 gene mutations which have been identified in her family. SUMMARY & PLAN   1) Ms. Reggie Whitt genetic test results are negative meaning there were no clinically significant mutations detected in the 83 genes analyzed. She does not carry the familial CDH1 or CHEK2 gene mutations which have been previously identified in her family. 2) There are no changes in medical management for Ms. Ellsworth based on her negative genetic test results. 3) We encourage Ms. Ellsworth to contact us every 1-2 years to determine if there are any new genetic testing or research options available. 4) We encourage Ms. Ellsworth to contact us with updates to her personal and/or familys cancer history as this information may alter our assessment and/or recommendations. The [facility] would be glad to offer our assistance should you have any questions or concerns about this information. Please feel free to contact us at [phone]. Leona Valenzuela.  Shyla Jackson, MS, [facility]   Licensed Genetic Counselor         CC:  Ms. Hillary House, APRN-CNP  MD Case Rivas MD Flonnie Parchment, MD

## 2021-11-01 ENCOUNTER — HOSPITAL ENCOUNTER (OUTPATIENT)
Dept: RADIATION ONCOLOGY | Facility: MEDICAL CENTER | Age: 71
Discharge: HOME | End: 2021-11-01
Attending: STUDENT IN AN ORGANIZED HEALTH CARE EDUCATION/TRAINING PROGRAM
Payer: MEDICARE

## 2021-11-01 PROCEDURE — G6002 STEREOSCOPIC X-RAY GUIDANCE: HCPCS | Performed by: STUDENT IN AN ORGANIZED HEALTH CARE EDUCATION/TRAINING PROGRAM

## 2021-11-01 PROCEDURE — 77427 RADIATION TX MANAGEMENT X5: CPT | Performed by: STUDENT IN AN ORGANIZED HEALTH CARE EDUCATION/TRAINING PROGRAM

## 2021-11-01 PROCEDURE — 77387 GUIDANCE FOR RADJ TX DLVR: CPT | Performed by: STUDENT IN AN ORGANIZED HEALTH CARE EDUCATION/TRAINING PROGRAM

## 2021-11-01 PROCEDURE — 77412 RADIATION TX DELIVERY LVL 3: CPT | Performed by: STUDENT IN AN ORGANIZED HEALTH CARE EDUCATION/TRAINING PROGRAM

## 2021-11-02 ENCOUNTER — HOSPITAL ENCOUNTER (OUTPATIENT)
Dept: RADIATION ONCOLOGY | Facility: MEDICAL CENTER | Age: 71
Discharge: HOME | End: 2021-11-02
Attending: RADIOLOGY
Payer: MEDICARE

## 2021-11-02 ENCOUNTER — HOSPITAL ENCOUNTER (OUTPATIENT)
Dept: RADIATION ONCOLOGY | Facility: MEDICAL CENTER | Age: 71
Discharge: HOME | End: 2021-11-02
Attending: STUDENT IN AN ORGANIZED HEALTH CARE EDUCATION/TRAINING PROGRAM
Payer: MEDICARE

## 2021-11-02 VITALS
HEART RATE: 16 BPM | RESPIRATION RATE: 16 BRPM | SYSTOLIC BLOOD PRESSURE: 156 MMHG | OXYGEN SATURATION: 93 % | BODY MASS INDEX: 34.3 KG/M2 | WEIGHT: 212.5 LBS | TEMPERATURE: 97.2 F | DIASTOLIC BLOOD PRESSURE: 73 MMHG

## 2021-11-02 DIAGNOSIS — Z17.0 CARCINOMA OF UPPER-OUTER QUADRANT OF LEFT BREAST IN FEMALE, ESTROGEN RECEPTOR POSITIVE (HCC): Primary | ICD-10-CM

## 2021-11-02 DIAGNOSIS — C50.412 CARCINOMA OF UPPER-OUTER QUADRANT OF LEFT BREAST IN FEMALE, ESTROGEN RECEPTOR POSITIVE (HCC): Primary | ICD-10-CM

## 2021-11-02 PROCEDURE — 77280 THER RAD SIMULAJ FIELD SMPL: CPT | Performed by: STUDENT IN AN ORGANIZED HEALTH CARE EDUCATION/TRAINING PROGRAM

## 2021-11-02 PROCEDURE — 77336 RADIATION PHYSICS CONSULT: CPT | Performed by: STUDENT IN AN ORGANIZED HEALTH CARE EDUCATION/TRAINING PROGRAM

## 2021-11-02 PROCEDURE — G6002 STEREOSCOPIC X-RAY GUIDANCE: HCPCS | Performed by: STUDENT IN AN ORGANIZED HEALTH CARE EDUCATION/TRAINING PROGRAM

## 2021-11-02 PROCEDURE — 77412 RADIATION TX DELIVERY LVL 3: CPT | Performed by: STUDENT IN AN ORGANIZED HEALTH CARE EDUCATION/TRAINING PROGRAM

## 2021-11-02 PROCEDURE — 77387 GUIDANCE FOR RADJ TX DLVR: CPT | Performed by: STUDENT IN AN ORGANIZED HEALTH CARE EDUCATION/TRAINING PROGRAM

## 2021-11-02 NOTE — PROGRESS NOTES
Radiation Oncology On-Treatment Visit:     Fraction # 16 / 20 (42.05 Gy)    Diagnosis:  Ms. Na Titus is a 66 y.o. female with left upper outer breast pathological anatomic stage IIA ([id number]) invasive ductal carcinoma, G3, ER+/WA+/HER2-, status post left breast lumpectomy and sentinel lymph node biopsy on 2021.     Final pathology revealed a 4.9 cm invasive ductal carcinoma, G3, negative margins, with DCIS present, intermediate nuclear grade, solid and cribriform pattern with necrosis and microcalcification with negative margins. 0/4 lymph nodes positive for carcinoma. Oncotype score 22. Treatment course: Post-operative radiation to the left breast    Progress note:  Ms. Na Titus was seen and evaluated. Patient is moisturizing the skin over her breast. She has minimal fatigue, no complaints. Physical exam:   VITAL SIGNS: BP (!) 156/73   Pulse (!) 16   Temp 97.2 °F (36.2 °C) (Temporal)   Resp 16   Wt 212 lb 8 oz (96.4 kg)   SpO2 93%   BMI 34.30 kg/m²   ECO Symptomatic but completely ambulatory   Breast: Mild erythema over left breast, no desquamation. Plan:  · Continue radiation therapy as planned. Post-treatment visit in 1 month after completing treatment on Monday. She meets with her Medical Oncologist on Monday to discuss endocrine treatment. · I have checked the imaging performed to date, which confirm appropriate positioning.

## 2021-11-02 NOTE — PROGRESS NOTES
Radha Factor  11/2/2021  Wt Readings from Last 3 Encounters:   11/02/21 212 lb 8 oz (96.4 kg)   10/26/21 211 lb 8 oz (95.9 kg)   10/19/21 211 lb (95.7 kg)     Body mass index is 34.3 kg/m². Pt here for OTV. Patent states some soreness the breast. Patient tearful thinking about her  that passed. Dr Caruso [place] to [facility].    Treatment Area:breast    Patient was seen today for weekly visit. Comfort Alteration    Fatigue: Severe    Nutritional Alteration  Anorexia: No   Nausea: No   Vomiting: No     Mucous Membrane Alteration  Drainage: No  Lymphedema: No    Skin Alteration   Sensation:tender    Radiation Dermatitis:  Intact [x]     Erythema  []     Discoloration  []     Rash []     Dry desquamation  []     Moist desquamation []       Emotional  Coping: effective      Injury, potential bleeding or infection: none    No results found for: WBC, PLT      BP (!) 156/73   Pulse (!) 16   Temp 97.2 °F (36.2 °C) (Temporal)   Resp 16   Wt 212 lb 8 oz (96.4 kg)   SpO2 93%   BMI 34.30 kg/m²   Patient Currently in Pain: No                 Assessment/Plan: Patient was seen today for weekly visit.       Uriel Osuna RN

## 2021-11-03 ENCOUNTER — HOSPITAL ENCOUNTER (OUTPATIENT)
Dept: RADIATION ONCOLOGY | Facility: MEDICAL CENTER | Age: 71
Discharge: HOME | End: 2021-11-03
Attending: STUDENT IN AN ORGANIZED HEALTH CARE EDUCATION/TRAINING PROGRAM
Payer: MEDICARE

## 2021-11-03 PROCEDURE — G6002 STEREOSCOPIC X-RAY GUIDANCE: HCPCS | Performed by: STUDENT IN AN ORGANIZED HEALTH CARE EDUCATION/TRAINING PROGRAM

## 2021-11-03 PROCEDURE — 77387 GUIDANCE FOR RADJ TX DLVR: CPT | Performed by: STUDENT IN AN ORGANIZED HEALTH CARE EDUCATION/TRAINING PROGRAM

## 2021-11-03 PROCEDURE — 77336 RADIATION PHYSICS CONSULT: CPT | Performed by: STUDENT IN AN ORGANIZED HEALTH CARE EDUCATION/TRAINING PROGRAM

## 2021-11-03 PROCEDURE — 77412 RADIATION TX DELIVERY LVL 3: CPT | Performed by: STUDENT IN AN ORGANIZED HEALTH CARE EDUCATION/TRAINING PROGRAM

## 2021-11-04 ENCOUNTER — HOSPITAL ENCOUNTER (OUTPATIENT)
Dept: RADIATION ONCOLOGY | Facility: MEDICAL CENTER | Age: 71
Discharge: HOME | End: 2021-11-04
Attending: STUDENT IN AN ORGANIZED HEALTH CARE EDUCATION/TRAINING PROGRAM
Payer: MEDICARE

## 2021-11-04 PROCEDURE — G6002 STEREOSCOPIC X-RAY GUIDANCE: HCPCS | Performed by: STUDENT IN AN ORGANIZED HEALTH CARE EDUCATION/TRAINING PROGRAM

## 2021-11-04 PROCEDURE — 77412 RADIATION TX DELIVERY LVL 3: CPT | Performed by: STUDENT IN AN ORGANIZED HEALTH CARE EDUCATION/TRAINING PROGRAM

## 2021-11-04 PROCEDURE — 77387 GUIDANCE FOR RADJ TX DLVR: CPT | Performed by: STUDENT IN AN ORGANIZED HEALTH CARE EDUCATION/TRAINING PROGRAM

## 2021-11-04 NOTE — PROGRESS NOTES
Nutrition Brief: Wt stable    Wt Readings from Last 5 Encounters:   11/02/21 212 lb 8 oz (96.4 kg)   10/26/21 211 lb 8 oz (95.9 kg)        Evaluation: Wts stable. She reports severe fatigue. No change in nutrition alterations. Will reassess next week  Previously educated: N/V Nutrition Therapy, Poor Appetite, and ONS recommendations    Recommendations:  1.  Will continue to monitor po intakes and change in nutrition alterations      Randy Marie RD, LD  Office Number: [phone]

## 2021-11-05 ENCOUNTER — HOSPITAL ENCOUNTER (OUTPATIENT)
Dept: RADIATION ONCOLOGY | Facility: MEDICAL CENTER | Age: 71
Discharge: HOME | End: 2021-11-05
Attending: STUDENT IN AN ORGANIZED HEALTH CARE EDUCATION/TRAINING PROGRAM
Payer: MEDICARE

## 2021-11-05 PROCEDURE — 77412 RADIATION TX DELIVERY LVL 3: CPT | Performed by: STUDENT IN AN ORGANIZED HEALTH CARE EDUCATION/TRAINING PROGRAM

## 2021-11-05 PROCEDURE — G6002 STEREOSCOPIC X-RAY GUIDANCE: HCPCS | Performed by: STUDENT IN AN ORGANIZED HEALTH CARE EDUCATION/TRAINING PROGRAM

## 2021-11-05 PROCEDURE — 77387 GUIDANCE FOR RADJ TX DLVR: CPT | Performed by: STUDENT IN AN ORGANIZED HEALTH CARE EDUCATION/TRAINING PROGRAM

## 2021-11-08 ENCOUNTER — HOSPITAL ENCOUNTER (OUTPATIENT)
Dept: RADIATION ONCOLOGY | Facility: MEDICAL CENTER | Age: 71
Discharge: HOME | End: 2021-11-08
Attending: STUDENT IN AN ORGANIZED HEALTH CARE EDUCATION/TRAINING PROGRAM
Payer: MEDICARE

## 2021-11-08 PROCEDURE — G6002 STEREOSCOPIC X-RAY GUIDANCE: HCPCS | Performed by: STUDENT IN AN ORGANIZED HEALTH CARE EDUCATION/TRAINING PROGRAM

## 2021-11-08 PROCEDURE — 77387 GUIDANCE FOR RADJ TX DLVR: CPT | Performed by: STUDENT IN AN ORGANIZED HEALTH CARE EDUCATION/TRAINING PROGRAM

## 2021-11-08 PROCEDURE — 77427 RADIATION TX MANAGEMENT X5: CPT | Performed by: STUDENT IN AN ORGANIZED HEALTH CARE EDUCATION/TRAINING PROGRAM

## 2021-11-08 PROCEDURE — 77412 RADIATION TX DELIVERY LVL 3: CPT | Performed by: STUDENT IN AN ORGANIZED HEALTH CARE EDUCATION/TRAINING PROGRAM

## 2021-11-09 ENCOUNTER — TELEPHONE (OUTPATIENT)
Dept: ONCOLOGY | Age: 71
End: 2021-11-09

## 2021-11-09 NOTE — TELEPHONE ENCOUNTER
Called and left message for Terrancemitch. I let her know that I am her moms nurse navigator at [facility] ([facility]). I wanted to check in with her to see if her and her mom had decided to follow with a new surgeon since Dr. Rita Cox does not take her insurance anymore. I let her know that I could give her some names and they can see if they take her mom's insurance. I also let [facility] know that Dr. Ghazala Baltazar would probably be agreeable to follow and would refer her to a surgeon on an as needed basis. I encouraged Obdulia to give me a call back with their decision or any other questions. Called Jacquelyn to check in on her post radiation therapy. She relates she is done and she thinks she did pretty well. She relates she is fatigued but no skin problems. I let her know about oncology rehab we could set her up with if she needs it. She relates that she had knee surgery in January and with covid restrictions she did not get all the treatment so she thinks she may see about getting therapy for that. I let her know that would also I am sure help with her stamina and recouping from her surgery and radiation. I let her know I left message with her daughter to see about follow up with a surgeon. I let her know I could give her some names or she could talk to Dr Ghazala Baltazar and see if she could just follow with her and if a surgeon is needed in the future Dr. Jaguar Birch can refer her. rGiffin Mckeon relates that sounds good to her. Griffin Mckeon has follow up with Dr. Ghazala Baltazar just before Thanksgiving. I suggested she talk to Dr. Ghazala Baltazar about it. I encouraged Griffin Mckeon to call me as needed. Pt on pending.

## 2021-11-30 ENCOUNTER — TELEPHONE (OUTPATIENT)
Dept: ONCOLOGY | Age: 71
End: 2021-11-30

## 2021-11-30 DIAGNOSIS — Z17.0 MALIGNANT NEOPLASM OF BREAST IN FEMALE, ESTROGEN RECEPTOR POSITIVE, UNSPECIFIED LATERALITY, UNSPECIFIED SITE OF BREAST (HCC): Primary | ICD-10-CM

## 2021-11-30 DIAGNOSIS — C50.919 MALIGNANT NEOPLASM OF BREAST IN FEMALE, ESTROGEN RECEPTOR POSITIVE, UNSPECIFIED LATERALITY, UNSPECIFIED SITE OF BREAST (HCC): Primary | ICD-10-CM

## 2021-12-13 ENCOUNTER — HOSPITAL ENCOUNTER (OUTPATIENT)
Dept: RADIATION ONCOLOGY | Facility: MEDICAL CENTER | Age: 71
Discharge: HOME | End: 2021-12-13
Attending: STUDENT IN AN ORGANIZED HEALTH CARE EDUCATION/TRAINING PROGRAM
Payer: MEDICARE

## 2021-12-13 VITALS
RESPIRATION RATE: 18 BRPM | SYSTOLIC BLOOD PRESSURE: 131 MMHG | TEMPERATURE: 97 F | DIASTOLIC BLOOD PRESSURE: 69 MMHG | OXYGEN SATURATION: 95 % | BODY MASS INDEX: 34.77 KG/M2 | HEART RATE: 77 BPM | WEIGHT: 215.4 LBS

## 2021-12-13 PROCEDURE — 99212 OFFICE O/P EST SF 10 MIN: CPT | Performed by: RADIOLOGY

## 2021-12-13 NOTE — PROGRESS NOTES
[facility]            Radiation Oncology          [address] [facility]          [place], [address]        Bryan Maxim: [phone]        F: [phone]       [email or web address]         Date of Service: 2021     Location:  [address] [place] [address] FOLLOW UP NOTE    Patient ID:   Darrel Tidwell  : 1950   MRN: [id number]    DIAGNOSIS: invasive ductal carcinoma of the left breast pT2N0(sn)M0, ER/OR positive, Her2 unamplified, G3, oncotype 22    TREATMENT HISTORY:   1. Lumpectomy and SLNBx   2. Adjuvant radiation therapy 40. 05Gy/15fx followed by 10Gy/5fx cavity boost completed 21  3. Adjuvant endocrine therapy planned       INTERVAL HISTORY:   Darrel Tidwell is a 66 y.o. Rosibel Bloodgood female who presents for follow-up the above diagnosis treatment history. She is doing well and is healed from the acute toxicities of radiation therapy. She denies any skin changes, lumps or bumps, nipple discharge, problems with range of motion of her left or right upper extremity, swelling of either upper extremity, new bony pain, cough, or involuntary weight loss. She does have some point tenderness to palpation of the lumpectomy cavity site but it is not new and is getting better. There is no oozing or drainage in all prior surgical incisions are well-healed and intact. She has not started endocrine therapy yet but plans to soon. She does have an appoint with her surgeon next few months but is not sure when.     MEDICATIONS:    Current Outpatient Medications:     amLODIPine (NORVASC) 5 MG tablet, Take 5 mg by mouth daily, Disp: , Rfl:     citalopram (CELEXA) 40 MG tablet, Take 40 mg by mouth daily, Disp: , Rfl:     apixaban (ELIQUIS) 5 MG TABS tablet, Take 5 mg by mouth 2 times daily, Disp: , Rfl:     ferrous sulfate (IRON 325) 325 (65 Fe) MG tablet, Take 325 mg by mouth daily (with breakfast), Disp: , Rfl:     losartan (COZAAR) 50 MG tablet, Take 50 mg by mouth daily, Disp: , Rfl:   omeprazole (PRILOSEC) 20 MG delayed release capsule, Take 20 mg by mouth as needed, Disp: , Rfl:     rOPINIRole (REQUIP) 1 MG tablet, Take 1 mg by mouth 2 times daily, Disp: , Rfl:     diazePAM (VALIUM) 5 MG tablet, Take 5 mg by mouth as needed for Anxiety. Indications: 60 mins prior to injection therapy, Disp: , Rfl:     gabapentin (NEURONTIN) 300 MG capsule, Take 300 mg by mouth as needed. , Disp: , Rfl:     Cholecalciferol (VITAMIN D3) 1.25 MG (60746 UT) CAPS, Take by mouth as needed, Disp: , Rfl:     ALLERGIES:  Allergies   Allergen Reactions    Crestor [Rosuvastatin]      myalgia    Lisinopril      cough    Pcn [Penicillins] Hives         REVIEW OF SYSTEMS:    A full 14 point review of systems was performed and assessed and found to be negative except as noted above. PHYSICAL EXAMINATION:    CHAPERONE: Not Required    ECO Asymptomatic    VITAL SIGNS: /69   Pulse 77   Temp 97 °F (36.1 °C) (Temporal)   Resp 18   Wt 215 lb 6.4 oz (97.7 kg)   SpO2 95%   BMI 34.77 kg/m²   GENERAL:  General appearance is that of a well-nourished, well-developed in no apparent distress. HEENT: Normocephalic, atraumatic, EOMI, moist mucosa, no erythema. Indirect exam .  NECK:  No adenopathy or a palpable thyroid mass, trachea is midline. LYMPHATICS: No cervical, supraclavicular, or axillary adenopathy. HEART:  Normal rate and regular rhythm, normal heart sounds. LUNGS:  Pulmonary effort normal. Normal breath sounds. ABDOMEN:  Soft, nontender, non distended, and no hepatosplenomegaly. EXTREMITIES:  No edema. No calf tenderness. MSK:  No spinal tenderness. Normal ROM. NEUROLOGICAL: Alert and oriented. Strength and sensation intact bilaterally. No focal deficits. PSYCH: Mood normal, behavior normal.  SKIN: No erythema, no desquamation.         LABS:  No results found for: WBC, NEUTROABS, HGB, PLT  No results found for: , CEA  No results found for: PSA    IMAGING:   None new      ASSESSMENT AND PLAN:  Jake Thomas is a 66 y.o. female with left breast IDC pT2N0(sn)M0 ER/OH positive, Her2 unamplified status post lumpectomy and sentinel lymph node biopsy followed by adjuvant radiation therapy completed about a month ago who presents for follow-up. She is healing well from acute toxicities of radiation therapy. She denies any new problems. She will start endocrine therapy soon. Patient is recommended to come back in 6 months for another follow up visit and exam, or can call to come see us sooner if symptoms change. Patient was in agreement with my recommendations. All questions were answered to their satisfaction. Patient was advised to contact us anytime should they have any questions or concerns. Electronically signed by Max Anderson MD on 12/13/2021 at 9:12 AM        Medications Prescribed:   New Prescriptions    No medications on file       Orders: No orders of the defined types were placed in this encounter.       CC:  Patient Care Team:  Pepper Asher MD as PCP - General (Internal Medicine)  Destiny Cabrera (General Surgery)  Max Anderson MD as Consulting Physician (Radiation Oncology)  Erick Luong RN as Nurse Navigator (Oncology)

## 2021-12-13 NOTE — PROGRESS NOTES
Tonny Asher  12/13/2021  8:48 AM    Almas Dubon presents for 1 month follow up from radiation therapy to lt. Breast.  C/o some tenderness at surgical site if palpated. Voices no other complaints. Dr. Abi Willis notifies of assessment and patient examined. Almas Dubon is to follow up in 6 months. Vitals:    12/13/21 0840   BP: 131/69   Pulse: 77   Resp: 18   Temp: 97 °F (36.1 °C)   SpO2: 95%    :  Patient Currently in Pain: No             Wt Readings from Last 1 Encounters:   12/13/21 215 lb 6.4 oz (97.7 kg)                Current Outpatient Medications:     amLODIPine (NORVASC) 5 MG tablet, Take 5 mg by mouth daily, Disp: , Rfl:     citalopram (CELEXA) 40 MG tablet, Take 40 mg by mouth daily, Disp: , Rfl:     apixaban (ELIQUIS) 5 MG TABS tablet, Take 5 mg by mouth 2 times daily, Disp: , Rfl:     ferrous sulfate (IRON 325) 325 (65 Fe) MG tablet, Take 325 mg by mouth daily (with breakfast), Disp: , Rfl:     losartan (COZAAR) 50 MG tablet, Take 50 mg by mouth daily, Disp: , Rfl:     omeprazole (PRILOSEC) 20 MG delayed release capsule, Take 20 mg by mouth as needed, Disp: , Rfl:     rOPINIRole (REQUIP) 1 MG tablet, Take 1 mg by mouth 2 times daily, Disp: , Rfl:     diazePAM (VALIUM) 5 MG tablet, Take 5 mg by mouth as needed for Anxiety. Indications: 60 mins prior to injection therapy, Disp: , Rfl:     gabapentin (NEURONTIN) 300 MG capsule, Take 300 mg by mouth as needed. , Disp: , Rfl:     Cholecalciferol (VITAMIN D3) 1.25 MG (76462 UT) CAPS, Take by mouth as needed, Disp: , Rfl:     Immunizations:    Influenza status:    [x]   Current   []   Patient declined    Pneumococcal status:  [x]   Current  []   Patient declined  Covid status:   [x]  Dose #1:                     [x]  Dose #2: plus booster            []   Patient declined    Smoking Status:    [] Smoker - PPD:   [] Nonsmoker - Quit Date:               [x] Never a smoker      Cancer Screening:  Colonoscopy   [x] Current       [] Not current   [] Not current, but scheduled   [] NA  Mammogram   [x] Current       [] Not current   [] Not current, but scheduled   [] NA  Prostate           [] Current       [] Not current   [] Not current, but scheduled   [x] NA  PAP/Pelvic      [] Current       [] Not current   [] Not current, but scheduled   [x] NA  Skin                 [] Current       [] Not current   [] Not current, but scheduled   [x] NA     Hormone:  Lupron []   Last dose given:           Next dose due:   Eligard []   Last dose given:           Next dose due:   Aromatase Inhibitors []   Medication name:   N/A:  [x]           FALLS RISK SCREEN  Instructions:  Assess the patient and enter the appropriate indicators that are present for fall risk identification. Total the numbers entered and assign a fall risk score from Table 2.  Reassess patient at a minimum every 12 weeks or with status change. Assessment   Date  12/13/2021     1. Mental Ability: confusion/cognitively impaired 0     2. Elimination Issues: incontinence, frequency 0       3. Ambulatory: use of assistive devices (walker, cane, off-loading devices),        attached to equipment (IV pole, oxygen) 0     4. Sensory Limitations: dizziness, vertigo, impaired vision 0     5. Age less than 65        0     6. Age 72 or greater 1     7. Medication: diuretics, strong analgesics, hypnotics, sedatives,        antihypertensive agents 0   8. Falls:  recent history of falls within the last 3 months (not to include slipping or        tripping) 0   TOTAL 1    If score of 4 or greater was education given? No           TABLE 2   Risk Score Risk Level Plan of Care   0-3 Little or  No Risk 1. Provide assistance as indicated for ambulation activities  2. Reorient confused/cognitively impaired patient  3. Chair/bed in low position, stretcher/bed with siderails up except when performing patient care activities  5.   Educate patient/family/caregiver on falls prevention  6.  Reassess in 12 weeks or with any noted change in patient condition which places them at a risk for a fall   4-6 Moderate Risk 1. Provide assistance as indicated for ambulation activities  2. Reorient confused/cognitively impaired patient  3. Chair/bed in low position, stretcher/bed with siderails up except when performing patient care activities  4. Educate patient/family/caregiver on falls prevention     7 or   Higher High Risk 1. Place patient in easily observable treatment room  2. Patient attended at all times by family member or staff  3. Provide assistance as indicated for ambulation activities  4. Reorient confused/cognitively impaired patient  5. Chair/bed in low position, stretcher/bed with siderails up except when performing patient care activities  6.   Educate patient/family/caregiver on falls prevention         PLAN: Patient is seen today in follow up        Haim Asher RN

## 2021-12-14 ENCOUNTER — TELEPHONE (OUTPATIENT)
Dept: ONCOLOGY | Age: 71
End: 2021-12-14

## 2021-12-21 ENCOUNTER — TELEPHONE (OUTPATIENT)
Dept: PHYSICAL THERAPY | Facility: CLINIC | Age: 71
End: 2021-12-21

## 2021-12-21 NOTE — TELEPHONE ENCOUNTER
Phoned and left second message with patient to call to schedule oncology rehab.   Electronically signed by Kimi Mo PT on 12/21/2021 at 11:09 AM

## 2022-01-27 ENCOUNTER — HOSPITAL ENCOUNTER (EMERGENCY)
Facility: CLINIC | Age: 72
Discharge: HOME | End: 2022-01-27
Attending: EMERGENCY MEDICINE
Payer: MEDICARE

## 2022-01-27 ENCOUNTER — APPOINTMENT (OUTPATIENT)
Dept: GENERAL RADIOLOGY | Facility: CLINIC | Age: 72
End: 2022-01-27
Payer: MEDICARE

## 2022-01-27 ENCOUNTER — APPOINTMENT (OUTPATIENT)
Dept: CT IMAGING | Facility: CLINIC | Age: 72
End: 2022-01-27
Payer: MEDICARE

## 2022-01-27 VITALS
BODY MASS INDEX: 33.75 KG/M2 | WEIGHT: 210 LBS | OXYGEN SATURATION: 97 % | TEMPERATURE: 97.7 F | SYSTOLIC BLOOD PRESSURE: 165 MMHG | HEART RATE: 57 BPM | DIASTOLIC BLOOD PRESSURE: 75 MMHG | HEIGHT: 66 IN | RESPIRATION RATE: 16 BRPM

## 2022-01-27 DIAGNOSIS — S52.502A CLOSED FRACTURE OF DISTAL END OF LEFT RADIUS, UNSPECIFIED FRACTURE MORPHOLOGY, INITIAL ENCOUNTER: Primary | ICD-10-CM

## 2022-01-27 PROCEDURE — 6370000000 HC RX 637 (ALT 250 FOR IP): Performed by: EMERGENCY MEDICINE

## 2022-01-27 PROCEDURE — 70450 CT HEAD/BRAIN W/O DYE: CPT

## 2022-01-27 PROCEDURE — 6360000002 HC RX W HCPCS: Performed by: EMERGENCY MEDICINE

## 2022-01-27 PROCEDURE — 72125 CT NECK SPINE W/O DYE: CPT

## 2022-01-27 PROCEDURE — 73110 X-RAY EXAM OF WRIST: CPT

## 2022-01-27 PROCEDURE — 25600 CLTX DST RDL FX/EPHYS SEP WO: CPT

## 2022-01-27 PROCEDURE — 73100 X-RAY EXAM OF WRIST: CPT

## 2022-01-27 PROCEDURE — 96372 THER/PROPH/DIAG INJ SC/IM: CPT

## 2022-01-27 PROCEDURE — 99285 EMERGENCY DEPT VISIT HI MDM: CPT

## 2022-01-27 RX ORDER — OXYCODONE HYDROCHLORIDE AND ACETAMINOPHEN 5; 325 MG/1; MG/1
4 TABLET ORAL ONCE
Status: COMPLETED | OUTPATIENT
Start: 2022-01-27 | End: 2022-01-27

## 2022-01-27 RX ORDER — MORPHINE SULFATE 4 MG/ML
8 INJECTION, SOLUTION INTRAMUSCULAR; INTRAVENOUS ONCE
Status: COMPLETED | OUTPATIENT
Start: 2022-01-27 | End: 2022-01-27

## 2022-01-27 RX ORDER — OXYCODONE HYDROCHLORIDE AND ACETAMINOPHEN 5; 325 MG/1; MG/1
2 TABLET ORAL ONCE
Status: COMPLETED | OUTPATIENT
Start: 2022-01-27 | End: 2022-01-27

## 2022-01-27 RX ORDER — OXYCODONE HYDROCHLORIDE AND ACETAMINOPHEN 5; 325 MG/1; MG/1
1-2 TABLET ORAL EVERY 6 HOURS PRN
Qty: 20 TABLET | Refills: 0 | Status: SHIPPED | OUTPATIENT
Start: 2022-01-27 | End: 2022-01-30

## 2022-01-27 RX ADMIN — OXYCODONE AND ACETAMINOPHEN 4 TABLET: 5; 325 TABLET ORAL at 21:53

## 2022-01-27 RX ADMIN — OXYCODONE AND ACETAMINOPHEN 2 TABLET: 5; 325 TABLET ORAL at 18:10

## 2022-01-27 RX ADMIN — MORPHINE SULFATE 8 MG: 4 INJECTION INTRAVENOUS at 21:02

## 2022-01-27 ASSESSMENT — PAIN SCALES - GENERAL
PAINLEVEL_OUTOF10: 10

## 2022-01-27 ASSESSMENT — PAIN DESCRIPTION - ONSET: ONSET: ON-GOING

## 2022-01-27 ASSESSMENT — PAIN DESCRIPTION - PAIN TYPE: TYPE: ACUTE PAIN

## 2022-01-27 ASSESSMENT — PAIN DESCRIPTION - LOCATION: LOCATION: WRIST

## 2022-01-27 ASSESSMENT — PAIN DESCRIPTION - PROGRESSION: CLINICAL_PROGRESSION: NOT CHANGED

## 2022-01-27 ASSESSMENT — PAIN DESCRIPTION - ORIENTATION: ORIENTATION: LEFT

## 2022-01-27 ASSESSMENT — PAIN DESCRIPTION - FREQUENCY: FREQUENCY: CONTINUOUS

## 2022-01-27 ASSESSMENT — PAIN DESCRIPTION - DESCRIPTORS: DESCRIPTORS: ACHING

## 2022-01-27 NOTE — ED PROVIDER NOTES
[facility]  [address] [facility]  Phone: 09 Michelle Aquino      Pt Name: Tree Finley  MRN: [id number]  [place] 1950  Date of evaluation: 1/27/2022    CHIEF COMPLAINT       Chief Complaint   Patient presents with    Fall    Wrist Pain       HISTORY OF PRESENT ILLNESS    Tree Finley is a 66 y.o. female who presents to the emergency department by ambulance following a mechanical fall. She is on Eliquis for PE/DVT. Slipped and fell on an outstretched left arm. Did not strike her head denies neck pain or headache. No hip pain or leg pain. Denies any other complaints with exception of left wrist pain with deformity. Placed in a splint by paramedics pain ~10 worse with movement    REVIEW OF SYSTEMS       Constitutional: No fevers or chills   HEENT: No sore throat, rhinorrhea, or earache   Eyes: No blurry vision or double vision no drainage   Cardiovascular: No chest pain or tachycardia   Respiratory: No wheezing or shortness of breath no cough   Gastrointestinal: No nausea, vomiting, diarrhea, constipation, or abdominal pain   : No hematuria or dysuria   Musculoskeletal: Left wrist pain  Skin: No rash   Neurological: No focal neurologic complaints, paresthesias, weakness, or headache     PAST MEDICAL HISTORY    has a past medical history of Cancer ([address] Hypertension, Kidney stones, Oxygen desaturation during sleep, and Pulmonary embolism ([address] SURGICAL HISTORY      has a past surgical history that includes Cholecystectomy; Rotator cuff repair (Bilateral); Lithotripsy; Hysterectomy; Breast surgery; and joint replacement (Right, 01/13/2020).     CURRENT MEDICATIONS       Previous Medications    AMLODIPINE (NORVASC) 5 MG TABLET    Take 5 mg by mouth daily    APIXABAN (ELIQUIS) 5 MG TABS TABLET    Take 5 mg by mouth 2 times daily    CHOLECALCIFEROL (VITAMIN D3) 1.25 MG (71195 UT) CAPS    Take by mouth as needed    CITALOPRAM (CELEXA) 40 MG TABLET    Take 40 mg by mouth daily    DIAZEPAM (VALIUM) 5 MG TABLET    Take 5 mg by mouth as needed for Anxiety. Indications: 60 mins prior to injection therapy    FERROUS SULFATE (IRON 325) 325 (65 FE) MG TABLET    Take 325 mg by mouth daily (with breakfast)    GABAPENTIN (NEURONTIN) 300 MG CAPSULE    Take 300 mg by mouth as needed. LOSARTAN (COZAAR) 50 MG TABLET    Take 50 mg by mouth daily    OMEPRAZOLE (PRILOSEC) 20 MG DELAYED RELEASE CAPSULE    Take 20 mg by mouth as needed    ROPINIROLE (REQUIP) 1 MG TABLET    Take 1 mg by mouth 2 times daily       ALLERGIES     is allergic to crestor [rosuvastatin], lisinopril, and pcn [penicillins]. FAMILY HISTORY     She indicated that both of her sisters are . She indicated that all of her three brothers are . She indicated that both of her nieces are . She indicated that two of her four nephews are alive. family history includes Cancer in her brother, brother, brother, nephew, nephew, nephew, nephew, niece, niece, sister, and sister. SOCIAL HISTORY      reports that she has quit smoking. She has never used smokeless tobacco. She reports previous alcohol use. She reports previous drug use. PHYSICAL EXAM       ED Triage Vitals [22 1752]   BP Temp Temp Source Pulse Resp SpO2 Height Weight   (!) 165/75 97.7 °F (36.5 °C) Oral 57 16 97 % 5' 6\" (1.676 m) 210 lb (95.3 kg)       Constitutional: Alert, oriented x3, nontoxic, answering questions appropriately, acting properly for age, in no acute distress   HEENT: Extraocular muscles intact,   Neck: Trachea midline no posterior midline neck tenderness palpation. Cardiovascular: Regular rhythm and rate no murmurs   Respiratory: Clear to auscultation bilaterally no wheezes, rhonchi, rales, no respiratory distress no tachypnea no retractions no hypoxia  Gastrointestinal: Soft, nontender, nondistended, positive bowel sounds. No rebound, rigidity, or guarding.    Musculoskeletal: Left upper extremity no pain at the shoulder or elbow there is deformity to the left wrist radial ulnar arteries intact and capillary refill less than 2 seconds. No pain in the hand. Neurologic: Moving all 4 extremities without difficulty there are no gross focal neurologic deficits   Skin: Warm and dry       DIFFERENTIAL DIAGNOSIS/ MDM:     CT head and cervical spine. X-ray left wrist.    DIAGNOSTIC RESULTS     EKG: All EKG's are interpreted by the Emergency Department Physician who either signs or Co-signs this chart in the absence of a cardiologist.        Not indicated unless otherwise documented above    LABS:  No results found for this visit on 01/27/22. Not indicated unless otherwise documented above    RADIOLOGY:   I reviewed the radiologist interpretations:    [address]    (Results Pending)   CT CERVICAL SPINE WO CONTRAST    (Results Pending)   XR WRIST LEFT (MIN 3 VIEWS)    (Results Pending)       Not indicated unless otherwise documented above    EMERGENCY DEPARTMENT COURSE:     The patient was given the following medications:  Orders Placed This Encounter   Medications    oxyCODONE-acetaminophen (PERCOCET) 5-325 MG per tablet 2 tablet        Vitals:   -------------------------  BP (!) 165/75   Pulse 57   Temp 97.7 °F (36.5 °C) (Oral)   Resp 16   Ht 5' 6\" (1.676 m)   Wt 95.3 kg (210 lb)   SpO2 97%   BMI 33.89 kg/m²     7:15 PM care will be transferred.       (Please note that portions of this note were completed with a voice recognition program.  Efforts were made to edit the dictations but occasionally words are mis-transcribed.)    Alicja Tejeda DO   Attending Emergency Physician      Alicja Tejeda DO  01/27/22 3712

## 2022-01-27 NOTE — ED NOTES
Patient to ED via EMS to room 6  Patient here for complaint of wrist pain and tenderness after a fall  Patient states she got her foot caught on the car causing her to fall backwards and she used her left hand to catch herself causing her injury  Patient states she is on blood thinners but denies hitting her head, losing consciousness, or any other injury  States her pain is 10/10 at this time    Vitals obtained and call light provided  Patient resting comfortably on stretcher in no apparent distress  Respirations even and non-labored  Awaiting provider evaluation at this time     Tami Quach RN  01/27/22 6042

## 2022-01-28 ENCOUNTER — TELEPHONE (OUTPATIENT)
Dept: ORTHOPEDIC SURGERY | Age: 72
End: 2022-01-28

## 2022-01-28 DIAGNOSIS — M25.532 LEFT WRIST PAIN: Primary | ICD-10-CM

## 2022-01-28 NOTE — ED NOTES
Patient resting comfortably on stretcher, in no apparent distress  Respirations even and non-labored  Patient has no needs at this time  Call light remains within reach     Tami Quach RN  01/27/22 1942

## 2022-01-28 NOTE — ED NOTES
Patient sent home with 4 percocet 5-325mg per order from Dr. Hank Hoyt  Patient educated on use and verbalized understanding with readback  No questions  Patient to be discharged     Deidre Kussmaul, RN  01/27/22 9913

## 2022-01-28 NOTE — TELEPHONE ENCOUNTER
Patient's daughter called in to schedule the patient for Closed fracture of distal end of left radius, unspecified fracture morphology, initial encounter please advise and contact patient to schedule

## 2022-01-28 NOTE — ED NOTES
Spoke with Deberah Gitelman at [facility] to consult ortho on call for patient.       Nigel Serrano 139, RN  01/27/22 2008

## 2022-01-28 NOTE — ED PROVIDER NOTES
FACULTY SIGN-OUT  ADDENDUM     Care of this patient was assumed from Dr. Cinthia Darling. The patient was seen for Fall and Wrist Pain  . The patient's initial evaluation and plan have been discussed with the prior provider who initially evaluated the patient. Nursing Notes, Past Medical Hx, Past Surgical Hx, Social Hx, Allergies, and Family Hx were all reviewed. CHIEF COMPLAINT       Chief Complaint   Patient presents with    Fall    Wrist Pain         PAST MEDICAL HISTORY    has a past medical history of Cancer ([facility] [address] Hypertension, Kidney stones, Oxygen desaturation during sleep, and Pulmonary embolism ([facility] [address] SURGICAL HISTORY      has a past surgical history that includes Cholecystectomy; Rotator cuff repair (Bilateral); Lithotripsy; Hysterectomy; Breast surgery; and joint replacement (Right, 01/13/2020). CURRENT MEDICATIONS       Discharge Medication List as of 1/27/2022  9:38 PM      CONTINUE these medications which have NOT CHANGED    Details   amLODIPine (NORVASC) 5 MG tablet Take 5 mg by mouth dailyHistorical Med      citalopram (CELEXA) 40 MG tablet Take 40 mg by mouth dailyHistorical Med      apixaban (ELIQUIS) 5 MG TABS tablet Take 5 mg by mouth 2 times dailyHistorical Med      ferrous sulfate (IRON 325) 325 (65 Fe) MG tablet Take 325 mg by mouth daily (with breakfast)Historical Med      losartan (COZAAR) 50 MG tablet Take 50 mg by mouth dailyHistorical Med      omeprazole (PRILOSEC) 20 MG delayed release capsule Take 20 mg by mouth as neededHistorical Med      rOPINIRole (REQUIP) 1 MG tablet Take 1 mg by mouth 2 times dailyHistorical Med      diazePAM (VALIUM) 5 MG tablet Take 5 mg by mouth as needed for Anxiety. Indications: 60 mins prior to injection therapyHistorical Med      gabapentin (NEURONTIN) 300 MG capsule Take 300 mg by mouth as needed. Historical Med      Cholecalciferol (VITAMIN D3) 1.25 MG (69172 UT) CAPS Take by mouth as neededHistorical Med             ALLERGIES     is allergic to crestor [rosuvastatin], lisinopril, and pcn [penicillins]. FAMILY HISTORY     She indicated that both of her sisters are . She indicated that all of her three brothers are . She indicated that both of her nieces are . She indicated that two of her four nephews are alive. family history includes Cancer in her brother, brother, brother, nephew, nephew, nephew, nephew, niece, niece, sister, and sister. SOCIAL HISTORY      reports that she has quit smoking. She has never used smokeless tobacco. She reports previous alcohol use. She reports previous drug use. DIAGNOSTIC RESULTS     EKG: All EKG's are interpreted by the Emergency Department Physician who either signs or Co-signs this chart in the absence of a cardiologist.        RADIOLOGY:   Non-plain film images such as CT, Ultrasound and MRI are read by the radiologist. Plain radiographic images are visualized and the radiologist interpretations are reviewed as follows:   XR WRIST LEFT (2 VIEWS)   Final Result   Improved alignment of the comminuted intra-articular distal radius fracture   after reduction and casting. XR WRIST LEFT (MIN 3 VIEWS)   Final Result   Distal comminuted impacted radial fracture with volar angulation. CT HEAD WO CONTRAST   Final Result   No acute intracranial abnormality. Moderate chronic microvascular changes         CT CERVICAL SPINE WO CONTRAST   Final Result   Multilevel cervical spondylosis and degenerative disc disease. There is left   foraminal stenosis at C4-5 and C6-7 and right foraminal stenosis at C5-6. Evidence of paracervical spasm. No acute bony abnormalities are noted      RECOMMENDATIONS:   Further evaluation should be obtained with MR imaging if clinically indicated.              XR WRIST LEFT (2 VIEWS)    Result Date: 2022  EXAMINATION: 2 XRAY VIEWS OF THE LEFT WRIST 2022 6:31 pm COMPARISON: Examination from earlier today HISTORY: ORDERING SYSTEM PROVIDED HISTORY: post reduction TECHNOLOGIST PROVIDED HISTORY: post reduction Reason for Exam: POST REDUCTION FINDINGS: Overlying casting material obscures fine osseous detail. The comminuted and impacted intra-articular distal radius fracture demonstrates decreased radial and distal dorsal displacement after reduction. Improved alignment of the comminuted intra-articular distal radius fracture after reduction and casting. XR WRIST LEFT (MIN 3 VIEWS)    Result Date: 1/27/2022  EXAMINATION: 3 XRAY VIEWS OF THE LEFT WRIST 1/27/2022 6:12 pm COMPARISON: None. HISTORY: ORDERING SYSTEM PROVIDED HISTORY: pain TECHNOLOGIST PROVIDED HISTORY: pain Reason for Exam: fall FINDINGS: There is a comminuted distal radial fracture with impaction. This extends into the saccular cervical spine. There is dorsal displacement of the fracture fragments with volar angulation. Volar soft swelling identified. Mild-to-moderate degenerate elsewhere within the wrist.  The distal ulna and visualized carpal bones appear grossly intact. Degenerate changes of the carpal bones and the carpometacarpal joints noted. Distal comminuted impacted radial fracture with volar angulation. CT HEAD WO CONTRAST    Result Date: 1/27/2022  EXAMINATION: CT OF THE HEAD WITHOUT CONTRAST  1/27/2022 6:33 pm TECHNIQUE: CT of the head was performed without the administration of intravenous contrast. Dose modulation, iterative reconstruction, and/or weight based adjustment of the mA/kV was utilized to reduce the radiation dose to as low as reasonably achievable. COMPARISON: None. HISTORY: ORDERING SYSTEM PROVIDED HISTORY: fall TECHNOLOGIST PROVIDED HISTORY: fall Decision Support Exception - unselect if not a suspected or confirmed emergency medical condition->Emergency Medical Condition (MA) Reason for Exam: fall FINDINGS: BRAIN/VENTRICLES: There is no acute intracranial hemorrhage, mass effect or midline shift.   No abnormal extra-axial fluid collection. The gray-white differentiation is maintained without evidence of an acute infarct. There is no evidence of hydrocephalus. Mild involutional change. Moderate periventricular subcortical white suggestive chronic small vessel ischemic disease. Vascular calcifications. ORBITS: Cataract surgery. SINUSES: Mild ethmoid sinus mucosal thickening. SOFT TISSUES/SKULL:  No acute abnormality of the visualized skull or soft tissues. No acute intracranial abnormality. Moderate chronic microvascular changes     CT CERVICAL SPINE WO CONTRAST    Result Date: 1/27/2022  EXAMINATION: CT OF THE CERVICAL SPINE WITHOUT CONTRAST 1/27/2022 3:34 pm TECHNIQUE: CT of the cervical spine was performed without the administration of intravenous contrast. Multiplanar reformatted images are provided for review. Dose modulation, iterative reconstruction, and/or weight based adjustment of the mA/kV was utilized to reduce the radiation dose to as low as reasonably achievable. COMPARISON: None. HISTORY: ORDERING SYSTEM PROVIDED HISTORY: fall TECHNOLOGIST PROVIDED HISTORY: fall Decision Support Exception - unselect if not a suspected or confirmed emergency medical condition->Emergency Medical Condition (MA) Reason for Exam: fall FINDINGS: The cervical spine demonstrates decreasedmineralization with reversal of the cervical lordosis. There is no evidence of fracture or subluxation. There is loss of disc height with eburnation of the vertebral endplates at the S7-3, Z3-2, C6-7 and C7-A9auolam. There are anterior and posterior marginal osteophytes at multiple levels. The central canal is grossly patent. . There is bilateral facet hypertrophy at multiple levels throughout the cervical spine. The pedicles and posterior elements are otherwiseintact. There is partial left foraminal stenosis at C4-5 and C6-7 and right foraminal stenosis at C5-6. The prevertebral and paravertebral soft tissues are unremarkable.  The atlanto-dens interval and dens are intact. The visualized lung apices are clear. Vascular calcifications are seen compatible with atherosclerotic disease. Multilevel cervical spondylosis and degenerative disc disease. There is left foraminal stenosis at C4-5 and C6-7 and right foraminal stenosis at C5-6. Evidence of paracervical spasm. No acute bony abnormalities are noted RECOMMENDATIONS: Further evaluation should be obtained with MR imaging if clinically indicated. LABS:  No results found for this visit on 01/27/22. EMERGENCY DEPARTMENT COURSE:   Recent Vitals:    Vitals:    01/27/22 1752   BP: (!) 165/75   Pulse: 57   Resp: 16   Temp: 97.7 °F (36.5 °C)   TempSrc: Oral   SpO2: 97%   Weight: 95.3 kg (210 lb)   Height: 5' 6\" (1.676 m)     -------------------------  BP: (!) 165/75, Temp: 97.7 °F (36.5 °C), Pulse: 57, Resp: 16      The patient was given the following medications:  Orders Placed This Encounter   Medications    oxyCODONE-acetaminophen (PERCOCET) 5-325 MG per tablet 2 tablet    morphine sulfate (PF) injection 8 mg    oxyCODONE-acetaminophen (PERCOCET) 5-325 MG per tablet 4 tablet    oxyCODONE-acetaminophen (PERCOCET) 5-325 MG per tablet     Sig: Take 1-2 tablets by mouth every 6 hours as needed for Pain for up to 3 days. WARNING:  May cause drowsiness. May impair ability to operate vehicles or machinery. Do not use in combination with alcohol. Dispense:  20 tablet     Refill:  0           MEDICAL DECISION MAKING:     Patient presents after falling on outstretched hand. Has impacted and displaced left distal radius fracture. I did speak with Dr. Mary Kay Mcgee on-call for orthopedics and we discussed the need for reduction. He said his fourth trying and he will see the patient in the office. I did perform a hematoma block and then traction manipulation with splint placement. Alignment was improved on the postreduction x-ray.   Patient given a sling and orthopedic follow-up information. Advised to follow-up with Dr. Rosa Carrizales and to keep the splint in place. Will provide Percocet analgesia. Patient's family is here to drive the patient home as well. Advised to return right away if worse or for new or concerning symptoms or signs of compartment syndrome. They are comfortable with the plan. The patient presented with wrist pain. A fracture was detected on X-ray. The elbow was not affected and the hand is neurovascularly intact. No skin lesions were seen. There are no signs of compartment syndrome. The pulses are 2/4. The motor is 5/5. The sensation is intact. The patient was instructed to follow up in 2-3 days orthopedics. We also discussed returning to the Emergency Department immediately if new or worsening symptoms occur. We have discussed the symptoms which are most concerning (e.g., increasing pain, numbness, weakness, color change or coldness to the extremity) that necessitate immediate return. The patient understands that at this time there is no evidence for a more malignant underlying process, but the patient also understands that early in the process of an illness or injury, an emergency department workup can be falsely reassuring. Routine discharge counseling was given, and the patient understands that worsening, changing or persistent symptoms should prompt an immediate call or follow up with their primary physician or return to the emergency department. The importance of appropriate follow up was also discussed. I have reviewed the disposition diagnosis with the patient and or their family/guardian. I have answered their questions and given discharge instructions. They voiced understanding of these instructions and did not have any further questions or complaints.       CONSULTS:    None    CRITICAL CARE:     None    PROCEDURES:    Ortho Injury    Date/Time: 1/27/2022 9:30 PM  Performed by: Calos Tobin DO  Authorized by: Calos Tobin DO Consent: Verbal consent obtained. Consent given by: patient  Patient understanding: patient states understanding of the procedure being performed  Imaging studies: imaging studies available  Patient identity confirmed: verbally with patient  Time out: Immediately prior to procedure a \"time out\" was called to verify the correct patient, procedure, equipment, support staff and site/side marked as required. Injury location: wrist  Location details: left wrist  Injury type: fracture  Fracture type: distal radius  Pre-procedure neurovascular assessment: neurovascularly intact  Pre-procedure distal perfusion: normal  Pre-procedure neurological function: normal  Pre-procedure range of motion: reduced  Anesthesia: hematoma block    Anesthesia:  Local anesthesia used: yes  Local Anesthetic: lidocaine 1% without epinephrine  Anesthetic total: 4 mL    Sedation:  Patient sedated: no    Manipulation performed: yes  Reduction successful: Improved alignment. X-ray confirmed reduction: yes  Immobilization: splint  Splint type: radial gutter  Supplies used: Ortho-Glass  Post-procedure neurovascular assessment: post-procedure neurovascularly intact  Post-procedure distal perfusion: normal  Post-procedure neurological function: normal  Post-procedure range of motion: unchanged  Patient tolerance: patient tolerated the procedure well with no immediate complications            FINAL IMPRESSION      1.  Closed fracture of distal end of left radius, unspecified fracture morphology, initial encounter          DISPOSITION/PLAN   DISPOSITION Decision To Discharge 01/27/2022 09:36:05 PM      Condition on Disposition    Improved    PATIENT REFERRED TO:  Kyaw Wilhelm [address] [place] [address]  [phone]    Schedule an appointment as soon as possible for a visit in 3 days        DISCHARGE MEDICATIONS:  Discharge Medication List as of 1/27/2022  9:38 PM      START taking these medications    Details oxyCODONE-acetaminophen (PERCOCET) 5-325 MG per tablet Take 1-2 tablets by mouth every 6 hours as needed for Pain for up to 3 days. WARNING:  May cause drowsiness. May impair ability to operate vehicles or machinery. Do not use in combination with alcohol., Disp-20 tablet, R-0Print             (Please note that portions of this note were completed with a voice recognition program.  Efforts were made to edit the dictations but occasionally words are mis-transcribed.)        Inocencio Villarreal D.O.   Emergency Medicine Attending       Greg Silver, DO  01/27/22 2163

## 2022-02-01 ENCOUNTER — OFFICE VISIT (OUTPATIENT)
Dept: ORTHOPEDIC SURGERY | Age: 72
End: 2022-02-01
Payer: MEDICARE

## 2022-02-01 DIAGNOSIS — S52.502A CLOSED FRACTURE OF DISTAL END OF LEFT RADIUS, UNSPECIFIED FRACTURE MORPHOLOGY, INITIAL ENCOUNTER: Primary | ICD-10-CM

## 2022-02-01 PROCEDURE — 4040F PNEUMOC VAC/ADMIN/RCVD: CPT | Performed by: NURSE PRACTITIONER

## 2022-02-01 PROCEDURE — 29075 APPL CST ELBW FNGR SHORT ARM: CPT | Performed by: NURSE PRACTITIONER

## 2022-02-01 PROCEDURE — 1090F PRES/ABSN URINE INCON ASSESS: CPT | Performed by: NURSE PRACTITIONER

## 2022-02-01 PROCEDURE — 1123F ACP DISCUSS/DSCN MKR DOCD: CPT | Performed by: NURSE PRACTITIONER

## 2022-02-01 PROCEDURE — 3017F COLORECTAL CA SCREEN DOC REV: CPT | Performed by: NURSE PRACTITIONER

## 2022-02-01 PROCEDURE — G8400 PT W/DXA NO RESULTS DOC: HCPCS | Performed by: NURSE PRACTITIONER

## 2022-02-01 PROCEDURE — G8417 CALC BMI ABV UP PARAM F/U: HCPCS | Performed by: NURSE PRACTITIONER

## 2022-02-01 PROCEDURE — G8484 FLU IMMUNIZE NO ADMIN: HCPCS | Performed by: NURSE PRACTITIONER

## 2022-02-01 PROCEDURE — G8427 DOCREV CUR MEDS BY ELIG CLIN: HCPCS | Performed by: NURSE PRACTITIONER

## 2022-02-01 PROCEDURE — 99203 OFFICE O/P NEW LOW 30 MIN: CPT | Performed by: NURSE PRACTITIONER

## 2022-02-01 PROCEDURE — 1036F TOBACCO NON-USER: CPT | Performed by: NURSE PRACTITIONER

## 2022-02-01 RX ORDER — OXYCODONE HYDROCHLORIDE AND ACETAMINOPHEN 5; 325 MG/1; MG/1
1-2 TABLET ORAL EVERY 6 HOURS PRN
Qty: 28 TABLET | Refills: 0 | Status: SHIPPED | OUTPATIENT
Start: 2022-02-01 | End: 2022-02-08

## 2022-02-01 NOTE — PROGRESS NOTES
[facility]  [address] [place] [address] Phone: [phone]  Dept Fax: [phone]      Orthopaedic Trauma New Patient      CHIEF COMPLAINT:    Chief Complaint   Patient presents with    Arm Injury     left DOIL: 1/27/22       HISTORY OF PRESENT ILLNESS:    The patient is a 66 y.o. female who is being seen as a new patient for a left distal radius fracture that resulted from a fall from standing height onto an outstretched hand on 1/27/2022. Patient states she was attempting to get into the back seat of a car and her foot got caught, causing her to fall backwards, catching most of her weight onto her left arm. Was originally placed in splint and sling at [facility] ER on date of injury. Reports signficant left wrist, thumb and elbow pain. Denies any numbness or tingling. Patient is right hand dominant and does not work. She lives in a senior alf home and is mostly independently. Patient states she has a significant PMH for DVT/PE is on eliquis and recently finished radiation for left breast cancer in November 2021. Past Medical History:    Past Medical History:   Diagnosis Date    Cancer ([address]     Hypertension     Kidney stones     Oxygen desaturation during sleep     Pulmonary embolism ([facility])     orginated in knee traveled to lung       Past Surgical History:    Past Surgical History:   Procedure Laterality Date    BREAST SURGERY      CHOLECYSTECTOMY      HYSTERECTOMY      JOINT REPLACEMENT Right 01/13/2020    LITHOTRIPSY      ROTATOR CUFF REPAIR Bilateral        Current Medications:   Current Outpatient Medications   Medication Sig Dispense Refill    oxyCODONE-acetaminophen (PERCOCET) 5-325 MG per tablet Take 1-2 tablets by mouth every 6 hours as needed for Pain for up to 7 days. Intended supply: 7 days.  Take lowest dose possible to manage pain 28 tablet 0    amLODIPine (NORVASC) 5 MG tablet Take 5 mg by mouth daily      citalopram (CELEXA) 40 MG tablet Take 40 mg by mouth daily      apixaban (ELIQUIS) 5 MG TABS tablet Take 5 mg by mouth 2 times daily      ferrous sulfate (IRON 325) 325 (65 Fe) MG tablet Take 325 mg by mouth daily (with breakfast)      losartan (COZAAR) 50 MG tablet Take 50 mg by mouth daily      omeprazole (PRILOSEC) 20 MG delayed release capsule Take 20 mg by mouth as needed      rOPINIRole (REQUIP) 1 MG tablet Take 1 mg by mouth 2 times daily      diazePAM (VALIUM) 5 MG tablet Take 5 mg by mouth as needed for Anxiety. Indications: 60 mins prior to injection therapy      gabapentin (NEURONTIN) 300 MG capsule Take 300 mg by mouth as needed.  Cholecalciferol (VITAMIN D3) 1.25 MG (74156 UT) CAPS Take by mouth as needed       No current facility-administered medications for this visit. Allergies:    Crestor [rosuvastatin], Lisinopril, and Pcn [penicillins]    Social History:   Social History     Socioeconomic History    Marital status:      Spouse name: Not on file    Number of children: Not on file    Years of education: Not on file    Highest education level: Not on file   Occupational History    Not on file   Tobacco Use    Smoking status: Former Smoker    Smokeless tobacco: Never Used    Tobacco comment: quit 1991   Substance and Sexual Activity    Alcohol use: Not Currently    Drug use: Not Currently    Sexual activity: Not on file   Other Topics Concern    Not on file   Social History Narrative    Not on file     Social Determinants of Health     Financial Resource Strain:     Difficulty of Paying Living Expenses: Not on file   Food Insecurity:     Worried About [address] in the Last Year: Not on file    Guillermo of Food in the Last Year: Not on file   Transportation Needs:     Lack of Transportation (Medical): Not on file    Lack of Transportation (Non-Medical):  Not on file   Physical Activity:     Days of Exercise per Week: Not on file    Minutes of Exercise per Session: Not on file   Stress:     Feeling of Stress : Not on file   Social Connections:     Frequency of Communication with Friends and Family: Not on file    Frequency of Social Gatherings with Friends and Family: Not on file    Attends [employer] Services: Not on file    Active Member of Clubs or Organizations: Not on file    Attends Club or Organization Meetings: Not on file    Marital Status: Not on file   Intimate Partner Violence:     Fear of Current or Ex-Partner: Not on file    Emotionally Abused: Not on file    Physically Abused: Not on file    Sexually Abused: Not on file   Housing Stability:     Unable to Pay for Housing in the Last Year: Not on file    Number of [place] in the Last Year: Not on file    Unstable Housing in the Last Year: Not on file       Family History:  Family History   Problem Relation Age of Onset    Cancer Sister     Cancer Brother     Cancer Brother     Cancer Brother     Cancer Sister     Cancer Niece     Cancer Nephew     Cancer Niece     Cancer Nephew     Cancer Nephew     Cancer Nephew          REVIEW OF SYSTEMS:  Review of Systems    Gen: no fever, chills, malaise  CV: no chest pain or palpitations  Resp: no cough or shortness of breath  GI: no nausea, vomiting, diarrhea, or constipation  Neuro: no seizures, vertigo, or headaches  Msk: left wrist, thumb, elbow pain and swelling   10 remaining systems reviewed and negative      PHYSICAL EXAM:  There were no vitals taken for this visit. There is no height or weight on file to calculate BMI. Physical Exam  Gen: alert and oriented, no acute distress  Psych: Appropriate affect; Appropriate knowledge base; Appropriate mood; No hallucinations  Head: normocephalic atraumatic   Chest: symmetric chest excursion  Ortho Exam  LUE: Radial gutter splint in good condition. Able to flex and extend 2nd through 5th digits. 1st digit mostly covered by splint. All digits warm and well perfused with BCR.  Sensation intact to all digits. Ecchymosis and swelling noted to left elbow anterior and posterior with TTP of medial epicondyle. Limited ROM of the elbow due to stiffness and pain- currently in sling. Radiology:   History: Left distal radius fracture s/p reduction and splint application 3/81/2227. Comparison: 1/27/2022    Findings: 3 views of the left wrist (AP, Oblique, Lateral) in a skeletally mature patient re-demonstrating a comminuted, impacted left distal radius fracture. There is subtle improvement of dorsal angulation. No acute abnormalities of the ulna identified. No evidence of joint subluxation or dislocation. Impression: Comminuted distal radius fracture with slightly decreased dorsal tilt. ASSESSMENT:  66 y.o. female with a closed comminuted left distal radius fracture    PLAN:  - Radiographs today show no further displacement or joint involvement of her comminuted distal radius fracture from previous imaging.   - Based on age, co-morbidities, fracture pattern and angulation, this will be treated non-operatively. - Splint was removed and placed in short arm cast. Encouraged to discontinue the sling once pain is more tolerable to allow range of motion of the elbow. - Prescription for Percocet sent to pharmacy  - Plan to f/u in 5 weeks with repeat x-rays out of the cast and will also obtain elbow x-rays if still symptomatic at that time. Return in about 5 weeks (around 3/8/2022) for x-rays out of cast/splint/brace. Orders Placed This Encounter   Medications    oxyCODONE-acetaminophen (PERCOCET) 5-325 MG per tablet     Sig: Take 1-2 tablets by mouth every 6 hours as needed for Pain for up to 7 days. Intended supply: 7 days.  Take lowest dose possible to manage pain     Dispense:  28 tablet     Refill:  0     Reduce doses taken as pain becomes manageable       Orders Placed This Encounter   Procedures    XR HUMERUS LEFT (MIN 2 VIEWS)     Standing Status:   Future     Number of Occurrences:   1     Standing Expiration Date:   2/1/2023     Order Specific Question:   Reason for exam:     Answer:   left elbow pain        Electronically signed by SCOTT Guzmán CNP on 2/1/2022 at 1:20 PM    This note is created with the assistance of a speech recognition program.  While intending to generate a document that actually reflects the content of the visit, the document can still have some errors including those of syntax and sound a like substitutions which may escape proof reading.   In such instances, actual meaning can be extrapolated by contextual diversion

## 2022-02-04 ENCOUNTER — APPOINTMENT (OUTPATIENT)
Dept: GENERAL RADIOLOGY | Age: 72
End: 2022-02-04
Payer: MEDICARE

## 2022-02-04 ENCOUNTER — HOSPITAL ENCOUNTER (EMERGENCY)
Age: 72
Discharge: HOME | End: 2022-02-04
Attending: EMERGENCY MEDICINE
Payer: MEDICARE

## 2022-02-04 VITALS
HEART RATE: 70 BPM | SYSTOLIC BLOOD PRESSURE: 152 MMHG | TEMPERATURE: 97.7 F | RESPIRATION RATE: 18 BRPM | OXYGEN SATURATION: 89 % | DIASTOLIC BLOOD PRESSURE: 91 MMHG

## 2022-02-04 DIAGNOSIS — M79.602 PAIN OF LEFT UPPER EXTREMITY: Primary | ICD-10-CM

## 2022-02-04 PROCEDURE — 99285 EMERGENCY DEPT VISIT HI MDM: CPT

## 2022-02-04 PROCEDURE — 73110 X-RAY EXAM OF WRIST: CPT

## 2022-02-04 PROCEDURE — 73090 X-RAY EXAM OF FOREARM: CPT

## 2022-02-04 PROCEDURE — 6370000000 HC RX 637 (ALT 250 FOR IP): Performed by: STUDENT IN AN ORGANIZED HEALTH CARE EDUCATION/TRAINING PROGRAM

## 2022-02-04 RX ORDER — OXYCODONE HYDROCHLORIDE AND ACETAMINOPHEN 5; 325 MG/1; MG/1
1 TABLET ORAL ONCE
Status: COMPLETED | OUTPATIENT
Start: 2022-02-04 | End: 2022-02-04

## 2022-02-04 RX ADMIN — OXYCODONE HYDROCHLORIDE AND ACETAMINOPHEN 1 TABLET: 5; 325 TABLET ORAL at 12:32

## 2022-02-04 ASSESSMENT — PAIN DESCRIPTION - LOCATION
LOCATION: ARM
LOCATION: ARM

## 2022-02-04 ASSESSMENT — PAIN SCALES - GENERAL
PAINLEVEL_OUTOF10: 4
PAINLEVEL_OUTOF10: 6

## 2022-02-04 ASSESSMENT — PAIN DESCRIPTION - ORIENTATION
ORIENTATION: LEFT
ORIENTATION: LEFT

## 2022-02-04 ASSESSMENT — PAIN DESCRIPTION - PAIN TYPE: TYPE: ACUTE PAIN

## 2022-02-04 NOTE — ED TRIAGE NOTES
Pt fell and fractured her left wrist. Pt got her wrist casted on Tuesday. She presents today with increased pain and swelling under the cast. Pt worries the cast is to tight. Pt tried to follow up with ortho and they sent her here.

## 2022-02-04 NOTE — ED PROVIDER NOTES
[address] [place] [address] ED     Emergency Department     Faculty Attestation    I performed a history and physical examination of the patient and discussed management with the resident. I reviewed the residents note and agree with the documented findings and plan of care. Any areas of disagreement are noted on the chart. I was personally present for the key portions of any procedures. I have documented in the chart those procedures where I was not present during the key portions. I have reviewed the emergency nurses triage note. I agree with the chief complaint, past medical history, past surgical history, allergies, medications, social and family history as documented unless otherwise noted below. For Physician Assistant/ Nurse Practitioner cases/documentation I have personally evaluated this patient and have completed at least one if not all key elements of the E/M (history, physical exam, and MDM). Additional findings are as noted. This patient was evaluated in the Emergency Department for symptoms described in the history of present illness. He/she was evaluated in the context of the global COVID-19 pandemic, which necessitated consideration that the patient might be at risk for infection with the SARS-CoV-2 virus that causes COVID-19. Institutional protocols and algorithms that pertain to the evaluation of patients at risk for COVID-19 are in a state of rapid change based on information released by regulatory bodies including the CDC and federal and state organizations. These policies and algorithms were followed during the patient's care in the ED. Patient here with cast concerns, placed in a cast for a left distal radius fracture with Dr. Gisel Reza 3 days ago. Is anticoagulated. On exam marked edema with old ecchymoses above the cast at the elbow. Distally however no edema in the fingers intact sensation, intact motor radial, median, and ulnar nerves. Normal cap refill.   Will x-ray, call Ortho      Critical Care     none    Wiliam Post MD, [facility]  Attending Emergency  Physician             Wiliam Post MD  02/04/22 1300

## 2022-02-04 NOTE — CONSULTS
Orthopedic Surgery Consult  (Dr. Lizzy Hooks)                   CC/Reason for consult:  Left wrist pain/Cast care    HPI:    The patient is a 66 y.o. female who presents today for left wrist pain with concern that her left short arm cast is too tight. She sustained an injury to her left wrist after a [address] on 1/27/22. She was seen in the ED acutely and placed into a splint. She was then seen in our orthopedic trauma clinic on 2/1/22 and was placed into a left short arm cast after nonoperative management was decided upon. She states she had pain after the cast was put on but felt that it worsened over the past few days. States she feels the cast is too tight. States she can fell all her fingers still without numbness. Denies any recent falls. 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 Authorizing Provider   oxyCODONE-acetaminophen (PERCOCET) 5-325 MG per tablet Take 1-2 tablets by mouth every 6 hours as needed for Pain for up to 7 days. Intended supply: 7 days.  Take lowest dose possible to manage pain 2/1/22 2/8/22  Kyaw Wilhelm, DO   amLODIPine (NORVASC) 5 MG tablet Take 5 mg by mouth daily    Historical Provider, MD   citalopram (CELEXA) 40 MG tablet Take 40 mg by mouth daily    Historical Provider, MD   apixaban (ELIQUIS) 5 MG TABS tablet Take 5 mg by mouth 2 times daily    Historical Provider, MD   ferrous sulfate (IRON 325) 325 (65 Fe) MG tablet Take 325 mg by mouth daily (with breakfast)    Historical Provider, MD   losartan (COZAAR) 50 MG tablet Take 50 mg by mouth daily    Historical Provider, MD   omeprazole (PRILOSEC) 20 MG delayed release capsule Take 20 mg by mouth as needed    Historical Provider, MD   rOPINIRole (REQUIP) 1 MG tablet Take 1 mg by mouth 2 times daily    Historical Provider, MD   diazePAM (VALIUM) 5 MG tablet Take 5 mg by mouth as needed for Anxiety. Indications: 60 mins prior to injection therapy    Historical Provider, MD   gabapentin (NEURONTIN) 300 MG capsule Take 300 mg by mouth as needed. Historical Provider, MD   Cholecalciferol (VITAMIN D3) 1.25 MG (18323 UT) CAPS Take by mouth as needed    Historical Provider, MD       Allergies:    Crestor [rosuvastatin], Lisinopril, and Pcn [penicillins]    Social History:   Social History     Socioeconomic History    Marital status:      Spouse name: None    Number of children: None    Years of education: None    Highest education level: None   Occupational History    None   Tobacco Use    Smoking status: Former Smoker    Smokeless tobacco: Never Used    Tobacco comment: quit 1991   Substance and Sexual Activity    Alcohol use: Not Currently    Drug use: Not Currently    Sexual activity: None   Other Topics Concern    None   Social History Narrative    None     Social Determinants of Health     Financial Resource Strain:     Difficulty of Paying Living Expenses: Not on file   Food Insecurity:     Worried About Running Out of Food in the Last Year: Not on file    Guillermo of Food in the Last Year: Not on file   Transportation Needs:     Lack of Transportation (Medical): Not on file    Lack of Transportation (Non-Medical):  Not on file   Physical Activity:     Days of Exercise per Week: Not on file    Minutes of Exercise per Session: Not on file   Stress:     Feeling of Stress : Not on file   Social Connections:     Frequency of Communication with Friends and Family: Not on file    Frequency of Social Gatherings with Friends and Family: Not on file    Attends Judaism Services: Not on file    Active Member of Clubs or Organizations: Not on file    Attends Club or Organization Meetings: Not on file    Marital Status: Not on file   Intimate Partner Violence:     Fear of Current or Ex-Partner: Not on file    Emotionally Abused: Not on file    Physically Abused: Not on file    Sexually Abused: Not on file   Housing Stability:     Unable to Pay for Housing in the Last Year: Not on file    Number of [place] in the Last Year: Not on file    Unstable Housing in the Last Year: Not on file       Family History:  Family History   Problem Relation Age of Onset    Cancer Sister     Cancer Brother     Cancer Brother     Cancer Brother     Cancer Sister     Cancer Niece     Cancer Nephew     Cancer Niece     Cancer Nephew     Cancer Nephew     Cancer Nephew        REVIEW OF SYSTEMS:    General: Negative for fever and chills. Cardiovascular: Negative for chest pain and palpitations. Musculoskeletal: Positive for left wrist pain. See HPI   Neurological: Negative for numbness. 10 remaining systems reviewed and negative    PHYSICAL EXAM:  /62   Pulse 82   Temp 97.7 °F (36.5 °C) (Oral)   Resp 18   SpO2 92%     Gen: AAOx3, NAD, cooperative   Head: Normocephalic  Cardiovascular: Regular rate, no dependent edema, distal pulses 2+  Respiratory: Chest symmetric, no accessory muscle use, normal respirations    LUE: Short arm cast in place which does appear tight at the distal portion of the cast. She can appropriately wiggle all exposed digits. Minimal pain with passive extension of digits. Sensation intact to exposed digits. Exposed digits display bcr <2 seconds. LABS:  No results for input(s): WBC, HGB, HCT, PLT, INR, PTT, NA, K, BUN, CREATININE, GLUCOSE, SEDRATE, CRP in the last 72 hours.     Invalid input(s): PT     Radiology:    Xray imaging of the left wrist/forearm was reviewed demonstrated a displaced distal radius fracture that is shortened and dorsally angulated. No significant alteration from previous xray imaging was noted. A/P: 66 y.o. female being seen for a left distal radius fracture with concern that the cast was too tight    -Cast was univalved on the ulnar border with significant relief in symptoms afterward  -Weight bearing: No heavy lifting, pushing, or pulling LUE  -Pain control per ED  -Maintain cast to LUE  -Ice and elevation for pain/swelling  -F/u with Dr. Jamey Greenfield at regularly scheduled office visit.  Call to schedule earlier appointment if cast loosens or falls off  -Please page Ortho with any questions or concerns    Lizzette Medellin DO,   PGY-3 Orthopedic Surgery  1:13 PM 2/4/2022

## 2022-02-04 NOTE — DISCHARGE INSTR - COC
Continuity of Care Form    Patient Name: Constantin Le   :  1950  MRN:  [id number]    Admit date:  2022  Discharge date:  ***    Code Status Order: No Order   Advance Directives:      Admitting Physician:  No admitting provider for patient encounter. PCP: Trinity Montana MD    Discharging Nurse: [facility] Unit/Room#: 10/10  Discharging Unit Phone Number: ***    Emergency Contact:   Extended Emergency Contact Information  Primary Emergency [facility]Monty [facility]  Mobile Phone: [phone]  Relation: Child  Preferred language: English   needed?  Yes    Past Surgical History:  Past Surgical History:   Procedure Laterality Date    BREAST SURGERY      CHOLECYSTECTOMY      HYSTERECTOMY      JOINT REPLACEMENT Right 2020    LITHOTRIPSY      ROTATOR CUFF REPAIR Bilateral        Immunization History:   Immunization History   Administered Date(s) Administered    COVID-19, Pfizer Purple top, DILUTE for use, 12+ yrs, 30mcg/0.3mL dose 2021, 2021, 2021       Active Problems:  Patient Active Problem List   Diagnosis Code    Encounter for screening for other suspected endocrine disorder Z13.29    Carcinoma of upper-outer quadrant of left breast in female, estrogen receptor positive [facility] C50.412, Z17.0       Isolation/Infection:   Isolation            No Isolation          Patient Infection Status       None to display            Nurse Assessment:  Last Vital Signs: /62   Pulse 82   Temp 97.7 °F (36.5 °C) (Oral)   Resp 18   SpO2 92%     Last documented pain score (0-10 scale): Pain Level: 6  Last Weight:   Wt Readings from Last 1 Encounters:   22 210 lb (95.3 kg)     Mental Status:  {IP PT MENTAL STATUS:}    IV Access:  { VAMSI IV ACCESS:637568865}    Nursing Mobility/ADLs:  Walking   {CHP DME PAHY:150418546}  Transfer  {CHP DME DRZZ:262679553}  Bathing  {CHP DME UAMQ:630415649}  Dressing  {CHP DME VCAI:351852969}  Toileting  {CHP DME VNEQ:224513606}  Feeding  {CHP DME UNRV:835586283}  Med Admin  {CHP DME LRFX:908570158}  Med Delivery   { VAMSI MED Delivery:344287486}    Wound Care Documentation and Therapy:        Elimination:  Continence: Bowel: {YES / TZ:68673}  Bladder: {YES / RA:80644}  Urinary Catheter: {Urinary Catheter:335596335}   Colostomy/Ileostomy/Ileal Conduit: {YES / XW:17484}       Date of Last BM: ***  No intake or output data in the 24 hours ending 22 1328  No intake/output data recorded.     Safety Concerns:     [employer] Safety Concerns:866647089}    Impairments/Disabilities:      [employer] Impairments/Disabilities:951538950}    Nutrition Therapy:  Current Nutrition Therapy:   [employer] Diet List:209531627}    Routes of Feeding: {CHP DME Other Feedings:720072099}  Liquids: {Slp liquid thickness:01706}  Daily Fluid Restriction: {CHP DME Yes amt example:866021795}  Last Modified Barium Swallow with Video (Video Swallowing Test): {Done Not Done TOGK:032471591}    Treatments at the Time of Hospital Discharge:   Respiratory Treatments: ***  Oxygen Therapy:  {Therapy; copd oxygen:78288}  Ventilator:    { CC Vent UNXE:385489562}    Rehab Therapies: {THERAPEUTIC INTERVENTION:5071130428}  Weight Bearing Status/Restrictions: [address] [employer] Weight Bearin}  Other Medical Equipment (for information only, NOT a DME order):  {EQUIPMENT:946035627}  Other Treatments: ***    Patient's personal belongings (please select all that are sent with patient):  {P DME Belongings:105033218}    RN SIGNATURE:  {Esignature:129691160}    CASE MANAGEMENT/SOCIAL WORK SECTION    Inpatient Status Date: ***    Readmission Risk Assessment Score:  Readmission Risk              Risk of Unplanned Readmission:  0           Discharging to Facility/ Agency   Name:   Address:  Phone:  Fax:    Dialysis Facility (if applicable)   Name:  Address:  Dialysis Schedule:  Phone:  Fax:    / signature: {Esignature:404392855}    PHYSICIAN SECTION    Prognosis: Good    Condition at Discharge: Stable    Rehab Potential (if transferring to Rehab): Good    Recommended Labs or Other Treatments After Discharge: Skilled nursing for medication education and assessment. PT/OT eval and treat. Bath aid as needed    Physician Certification: I certify the above information and transfer of Beverly Argueta  is necessary for the continuing treatment of the diagnosis listed and that she requires Home Care for greater 30 days.      Update Admission H&P: No change in H&P    PHYSICIAN SIGNATURE:  Electronically signed by Page Conner MD on 2/4/22 at 1:52 PM EST

## 2022-02-04 NOTE — ED PROVIDER NOTES
[facility] ED  Emergency Department Encounter  EmergencyMedicine Resident     Pt Sailaja Spence  MRN: [id number]  [place] 1950  Date of evaluation: 2/4/22  PCP:  Daniel Silva MD    CHIEF COMPLAINT       Chief Complaint   Patient presents with    Cast Problem     Pt broke her wrist on Tuesday and has a cast on her. She has been having more pain and increased swelling since the cast was placed. Ortho sent her in to be seen       HISTORY OF PRESENT ILLNESS  (Location/Symptom, Timing/Onset, Context/Setting, Quality, Duration, Modifying Factors, Severity.)      Antonio Yuan is a 66 y.o. female who presents with 6/10 left arm pain dull ache no exacerbating or alleviating factors nonradiating. Patient left for outstretched hand approximately 10 days ago splinted in the emergency department and follow-up with orthopedic surgery, was casted in the outpatient office 2/1. Denies any chest pain shortness of breath nausea vomiting fevers diarrhea. Denies any new or recent falls. Patient is on Eliquis for history of PE DVT. Denies numbness tingling weakness    PAST MEDICAL / SURGICAL / SOCIAL / FAMILY HISTORY      has a past medical history of Cancer ([address] Hypertension, Kidney stones, Oxygen desaturation during sleep, and Pulmonary embolism ([address] has a past surgical history that includes Cholecystectomy; Rotator cuff repair (Bilateral); Lithotripsy; Hysterectomy; Breast surgery; and joint replacement (Right, 01/13/2020). Social History     Socioeconomic History    Marital status:       Spouse name: Not on file    Number of children: Not on file    Years of education: Not on file    Highest education level: Not on file   Occupational History    Not on file   Tobacco Use    Smoking status: Former Smoker    Smokeless tobacco: Never Used    Tobacco comment: quit 1991   Substance and Sexual Activity    Alcohol use: Not Currently    Drug use: Not Currently    Sexual activity: Not on file   Other Topics Concern    Not on file   Social History Narrative    Not on file     Social Determinants of Health     Financial Resource Strain:     Difficulty of Paying Living Expenses: Not on file   Food Insecurity:     Worried About Running Out of Food in the Last Year: Not on file    Guillermo of Food in the Last Year: Not on file   Transportation Needs:     Lack of Transportation (Medical): Not on file    Lack of Transportation (Non-Medical): Not on file   Physical Activity:     Days of Exercise per Week: Not on file    Minutes of Exercise per Session: Not on file   Stress:     Feeling of Stress : Not on file   Social Connections:     Frequency of Communication with Friends and Family: Not on file    Frequency of Social Gatherings with Friends and Family: Not on file    Attends Buddhist Services: Not on file    Active Member of [address] [place], [place] [address] or Organizations: Not on file    Attends Club or Organization Meetings: Not on file    Marital Status: Not on file   Intimate Partner Violence:     Fear of Current or Ex-Partner: Not on file    Emotionally Abused: Not on file    Physically Abused: Not on file    Sexually Abused: Not on file   Housing Stability:     Unable to Pay for Housing in the Last Year: Not on file    Number of [place] in the Last Year: Not on file    Unstable Housing in the Last Year: Not on file       Family History   Problem Relation Age of Onset    Cancer Sister    [address] Sister     Cancer Niece     Cancer Nephew     Cancer Niece     Cancer [address]        Allergies:  Crestor [rosuvastatin], Lisinopril, and Pcn [penicillins]    Home Medications:  Prior to Admission medications    Medication Sig Start Date End Date Taking?  Authorizing Provider   oxyCODONE-acetaminophen (PERCOCET) 5-325 MG per tablet Take 1-2 tablets by mouth every 6 hours as needed for Pain for up to 7 days. Intended supply: 7 days. Take lowest dose possible to manage pain 2/1/22 2/8/22  Ortiz Frazier DO   amLODIPine (NORVASC) 5 MG tablet Take 5 mg by mouth daily    Historical Provider, MD   citalopram (CELEXA) 40 MG tablet Take 40 mg by mouth daily    Historical Provider, MD   apixaban (ELIQUIS) 5 MG TABS tablet Take 5 mg by mouth 2 times daily    Historical Provider, MD   ferrous sulfate (IRON 325) 325 (65 Fe) MG tablet Take 325 mg by mouth daily (with breakfast)    Historical Provider, MD   losartan (COZAAR) 50 MG tablet Take 50 mg by mouth daily    Historical Provider, MD   omeprazole (PRILOSEC) 20 MG delayed release capsule Take 20 mg by mouth as needed    Historical Provider, MD   rOPINIRole (REQUIP) 1 MG tablet Take 1 mg by mouth 2 times daily    Historical Provider, MD   diazePAM (VALIUM) 5 MG tablet Take 5 mg by mouth as needed for Anxiety. Indications: 60 mins prior to injection therapy    Historical Provider, MD   gabapentin (NEURONTIN) 300 MG capsule Take 300 mg by mouth as needed. Historical Provider, MD   Cholecalciferol (VITAMIN D3) 1.25 MG (42559 UT) CAPS Take by mouth as needed    Historical Provider, MD       REVIEW OF SYSTEMS    (2-9 systems for level 4, 10 or more for level 5)      Review of Systems   Constitutional: Negative for fever. HENT: Negative for congestion. Eyes: Negative for photophobia. Respiratory: Negative for shortness of breath. Cardiovascular: Negative for chest pain. Gastrointestinal: Negative for abdominal pain and vomiting. Endocrine: Negative for polyuria. Genitourinary: Negative for dysuria. Musculoskeletal: Positive for arthralgias. Skin: Negative for color change. Allergic/Immunologic: Negative for immunocompromised state. Neurological: Negative for dizziness. Hematological: Does not bruise/bleed easily. Psychiatric/Behavioral: Negative for agitation.      PHYSICAL EXAM   (up to 7 for level 4, 8 or more for level 5) ORDERED:  Orders Placed This Encounter   Medications    oxyCODONE-acetaminophen (PERCOCET) 5-325 MG per tablet 1 tablet           DIAGNOSTIC RESULTS / EMERGENCY DEPARTMENT COURSE / MDM     LABS:  No results found for this visit on 02/04/22. RADIOLOGY:  XR RADIUS ULNA LEFT (2 VIEWS)    Result Date: 2/4/2022  EXAMINATION: TWO XRAY VIEWS OF THE LEFT FOREARM 2/4/2022 12:27 pm COMPARISON: Left wrist plain radiographs from 02/01/2022 HISTORY: ORDERING SYSTEM PROVIDED HISTORY: casted, worsensing pain from recent fracture TECHNOLOGIST PROVIDED HISTORY: casted, worsensing pain from recent fracture 64-year-old female with worsening pain from recent fracture; casting FINDINGS: Overlying casting material limits evaluation of fine osseous detail. Comminuted, dorsally displaced, impacted intra-articular fracture through the metaphysis of the distal radius which is similar in alignment and morphology compared with 02/01/2022. Mild degenerative changes of the triscaphe and 1st CMC joints. No superimposed acute osseous abnormality. Mild degenerative changes of the radiohumeral and ulnohumeral joints. Mild enthesopathy at the epicondyles of the distal humerus. Mild soft tissue edema of the left forearm. 1. Comminuted, dorsally displaced, impacted intra-articular fracture through the metaphysis of the distal radius which is similar in alignment and morphology compared with 02/01/2022. 2. Mild osteoarthrosis. No superimposed acute osseous abnormality. 3. Mild degenerative changes at the radiohumeral and ulnohumeral joints. 4. Mild soft tissue edema of the left forearm. XR WRIST LEFT (MIN 3 VIEWS)    Result Date: 2/4/2022  EXAMINATION: 3 XRAY VIEWS OF THE LEFT WRIST 2/4/2022 1:15 pm COMPARISON: 1 February 2022 HISTORY: ORDERING SYSTEM PROVIDED HISTORY: Left distal radius fracture TECHNOLOGIST PROVIDED HISTORY: Left distal radius fracture FINDINGS: Overlying fiberglass cast limits assessment.   There is no significant interval change in alignment position of a comminuted intra-articular fracture of the distal radius with mild ventral angulation at the fracture site. Alignment is unchanged. Mild to moderate arthritic changes are present on the radial aspect of the wrist.  Navicular lunate space is well-preserved. No ulnar minus variance. Comminuted intra-articular fracture of the distal radius with alignment position of the fracture fragments unchanged from prior study. Overlying fiberglass cast limits assessment. EKG  None    All EKG's are interpreted by the Emergency Department Physician who either signs or Co-signs this chart in the absence of a cardiologist.    EMERGENCY DEPARTMENT COURSE:  Patient breathing quietly and unlabored on room air. Speech is normal and speaking in full sentences without requiring to pause to take a breath. Vital signs stable afebrile physical exam as above no concern for ischemia neurovascular injury distal to the injury. Will jenni-ray arm, patient due for Percocet dose, consult orthopedic surgery. Orthopedic surgery relieve pressure on the cast, recommends discharge follow-up outpatient. Patient would benefit from home health care, will speak with  to help arrange. Gave return precautions patient understands and agrees    PROCEDURES:  None    CONSULTS:  IP CONSULT TO ORTHOPEDIC SURGERY  IP CONSULT TO HOME CARE NEEDS    CRITICAL CARE:  None    FINAL IMPRESSION      1.  Pain of left upper extremity          DISPOSITION / PLAN     DISPOSITION Decision To Discharge 02/04/2022 01:42:47 PM      PATIENT REFERRED TO:  Carl Palafox MD  [address]  [phone]    Schedule an appointment as soon as possible for a visit in 1 week        DISCHARGE MEDICATIONS:  New Prescriptions    No medications on file       Corbin Ashley MD  Emergency Medicine Resident    (Please note that portions of thisnote were completed with a voice recognition program. Efforts were made to edit the dictations but occasionally words are mis-transcribed.)       Zulma Rivera MD  Resident  02/04/22 0539

## 2022-02-04 NOTE — CARE COORDINATION
Patient wants HC. Freedom of choice is given via [facility]. list and they chose [facility]. Referral is made and writer notified Marianne Petit of the patient's need. Patient and her daughter do not want to wait for [facility] to make a decision of acceptance will call them later to determine if they have accepted. If not, they have the [facility] list and will work through their PCP.      [address] [facility] from [facility] notified writer that they can accept this patient. Marianne Petit is notified that the patient has already left the ER. Marianne Petit will make contact with the patient.       Discharge [id number] [facility]  Clinical Case Management Department  Written by: Siobhan Vega RN    Patient Name: Marcy Hitchcock  Attending Provider: No att. providers found  Admit Date: 2022 12:06 PM  MRN: [id number]  Account: [de-identified]                     : 1950  Discharge Date: 2022      Disposition: Home with [facility]    Siobhan Vega RN

## 2022-02-21 DIAGNOSIS — S52.502A CLOSED FRACTURE OF DISTAL END OF LEFT RADIUS, UNSPECIFIED FRACTURE MORPHOLOGY, INITIAL ENCOUNTER: Primary | ICD-10-CM

## 2022-02-22 ENCOUNTER — OFFICE VISIT (OUTPATIENT)
Dept: ORTHOPEDIC SURGERY | Age: 72
End: 2022-02-22
Payer: MEDICARE

## 2022-02-22 DIAGNOSIS — S52.502D CLOSED FRACTURE OF DISTAL END OF LEFT RADIUS WITH ROUTINE HEALING, UNSPECIFIED FRACTURE MORPHOLOGY, SUBSEQUENT ENCOUNTER: Primary | ICD-10-CM

## 2022-02-22 DIAGNOSIS — S52.502A CLOSED FRACTURE OF DISTAL END OF LEFT RADIUS, UNSPECIFIED FRACTURE MORPHOLOGY, INITIAL ENCOUNTER: Primary | ICD-10-CM

## 2022-02-22 PROCEDURE — 99213 OFFICE O/P EST LOW 20 MIN: CPT | Performed by: STUDENT IN AN ORGANIZED HEALTH CARE EDUCATION/TRAINING PROGRAM

## 2022-02-22 PROCEDURE — G8427 DOCREV CUR MEDS BY ELIG CLIN: HCPCS | Performed by: STUDENT IN AN ORGANIZED HEALTH CARE EDUCATION/TRAINING PROGRAM

## 2022-02-22 PROCEDURE — 1036F TOBACCO NON-USER: CPT | Performed by: STUDENT IN AN ORGANIZED HEALTH CARE EDUCATION/TRAINING PROGRAM

## 2022-02-22 PROCEDURE — G8417 CALC BMI ABV UP PARAM F/U: HCPCS | Performed by: STUDENT IN AN ORGANIZED HEALTH CARE EDUCATION/TRAINING PROGRAM

## 2022-02-22 PROCEDURE — 29075 APPL CST ELBW FNGR SHORT ARM: CPT | Performed by: STUDENT IN AN ORGANIZED HEALTH CARE EDUCATION/TRAINING PROGRAM

## 2022-02-22 PROCEDURE — 1090F PRES/ABSN URINE INCON ASSESS: CPT | Performed by: STUDENT IN AN ORGANIZED HEALTH CARE EDUCATION/TRAINING PROGRAM

## 2022-02-22 PROCEDURE — G8400 PT W/DXA NO RESULTS DOC: HCPCS | Performed by: STUDENT IN AN ORGANIZED HEALTH CARE EDUCATION/TRAINING PROGRAM

## 2022-02-22 PROCEDURE — 1123F ACP DISCUSS/DSCN MKR DOCD: CPT | Performed by: STUDENT IN AN ORGANIZED HEALTH CARE EDUCATION/TRAINING PROGRAM

## 2022-02-22 PROCEDURE — 4040F PNEUMOC VAC/ADMIN/RCVD: CPT | Performed by: STUDENT IN AN ORGANIZED HEALTH CARE EDUCATION/TRAINING PROGRAM

## 2022-02-22 PROCEDURE — 3017F COLORECTAL CA SCREEN DOC REV: CPT | Performed by: STUDENT IN AN ORGANIZED HEALTH CARE EDUCATION/TRAINING PROGRAM

## 2022-02-22 PROCEDURE — G8484 FLU IMMUNIZE NO ADMIN: HCPCS | Performed by: STUDENT IN AN ORGANIZED HEALTH CARE EDUCATION/TRAINING PROGRAM

## 2022-02-22 NOTE — PROGRESS NOTES
[address]  [address] [facility] [address]  Dept: [phone]  Dept Fax: [phone]        Orthopaedic Trauma Clinic Follow Up      Subjective:   Date of Injury: 1/27/2022    Lory Michel is a 66y.o. year old female who presents to the clinic today for routine followup regarding her left distal radius fracture that is being treated conservatively with immobilization. She was placed into a short arm cast on 2/1/2022. On 2/4 she is presented to the ED complain of the cast being too tight and underwent successful univalve of the cast with relief of pain. She states today that the cast feels tight around the fracture site but approximately feels loose. She denies numbness or tingling to her digits. She is taking Tylenol for pain. She has been icing and elevating while at home. Review of Systems  Gen: no fever, chills, malaise  CV: no chest pain or palpitations  Resp: no cough or shortness of breath  GI: no nausea, vomiting, diarrhea, or constipation  Neuro: no numbness, tingling, or weakness  Msk: Positive for left wrist pain  10 remaining systems reviewed and negative    Objective : There were no vitals filed for this visit. There is no height or weight on file to calculate BMI. General: No acute distress, resting comfortably in the clinic  Neuro: alert. oriented  Eyes: Extra-ocular muscles intact  Pulm: Respirations unlabored and regular. Skin: warm, well perfused  Psych: Patient has good fund of knowledge and displays understanging of exam, diagnosis, and plan. MSK: Short arm cast was taken down. No skin breakdown or open wounds. Tender to palpation of the distal radius. Compartments soft. Ulnar/Median/AIN/PIN motor intact. Responsive to light touch sensation in the median/radial/ulnar nerve distribution.   Radial pulse 2+ with BCR    Radiology:  Region: Left wrist  History: Left distal radius fracture    Views: AP, lateral, oblique    Findings: 3 x-ray views of the left wrist demonstrate a shortened and dorsally angulated distal radius fracture without significant alteration in alignment from previous x-ray films. Comparison Films: 2/4/2022    Impression: Left distal radius fracture     Assessment:   66y.o. year old female with a left distal radius fracture  Plan:      -Reviewed the x-ray images with the patient in office today and discussed that we would still recommend conservative management with immobilization in a cast.  Patient was in agreement and displayed understanding of our treatment recommendation.  -Patient placed into a well-padded short arm cast in the office.  -No heavy lifting, pushing, pulling with left upper extremity  -Ice and elevate  -Follow-up in the office in 3 weeks for routine fracture care with x-ray imaging    Follow up:Return in about 3 weeks (around 3/15/2022) for Routine fracture care follow-up with x-ray imaging. No orders of the defined types were placed in this encounter. No orders of the defined types were placed in this encounter.       Electronically signed by Rochelle Bethea DO on 2/22/2022 at 3:16 PM

## 2022-03-15 ENCOUNTER — OFFICE VISIT (OUTPATIENT)
Dept: ORTHOPEDIC SURGERY | Age: 72
End: 2022-03-15
Payer: MEDICARE

## 2022-03-15 VITALS — WEIGHT: 210 LBS | BODY MASS INDEX: 33.75 KG/M2 | HEIGHT: 66 IN

## 2022-03-15 DIAGNOSIS — S52.502D CLOSED FRACTURE OF DISTAL END OF LEFT RADIUS WITH ROUTINE HEALING, UNSPECIFIED FRACTURE MORPHOLOGY, SUBSEQUENT ENCOUNTER: Primary | ICD-10-CM

## 2022-03-15 DIAGNOSIS — S52.502A CLOSED FRACTURE OF DISTAL END OF LEFT RADIUS, UNSPECIFIED FRACTURE MORPHOLOGY, INITIAL ENCOUNTER: Primary | ICD-10-CM

## 2022-03-15 PROCEDURE — 1123F ACP DISCUSS/DSCN MKR DOCD: CPT | Performed by: ORTHOPAEDIC SURGERY

## 2022-03-15 PROCEDURE — G8400 PT W/DXA NO RESULTS DOC: HCPCS | Performed by: ORTHOPAEDIC SURGERY

## 2022-03-15 PROCEDURE — 1090F PRES/ABSN URINE INCON ASSESS: CPT | Performed by: ORTHOPAEDIC SURGERY

## 2022-03-15 PROCEDURE — G8417 CALC BMI ABV UP PARAM F/U: HCPCS | Performed by: ORTHOPAEDIC SURGERY

## 2022-03-15 PROCEDURE — G8427 DOCREV CUR MEDS BY ELIG CLIN: HCPCS | Performed by: ORTHOPAEDIC SURGERY

## 2022-03-15 PROCEDURE — 1036F TOBACCO NON-USER: CPT | Performed by: ORTHOPAEDIC SURGERY

## 2022-03-15 PROCEDURE — G8484 FLU IMMUNIZE NO ADMIN: HCPCS | Performed by: ORTHOPAEDIC SURGERY

## 2022-03-15 PROCEDURE — 3017F COLORECTAL CA SCREEN DOC REV: CPT | Performed by: ORTHOPAEDIC SURGERY

## 2022-03-15 PROCEDURE — 4040F PNEUMOC VAC/ADMIN/RCVD: CPT | Performed by: ORTHOPAEDIC SURGERY

## 2022-03-15 PROCEDURE — 99213 OFFICE O/P EST LOW 20 MIN: CPT | Performed by: ORTHOPAEDIC SURGERY

## 2022-03-15 NOTE — PROGRESS NOTES
[facility]  [id number] [facility]  Dept Phone: [phone]  Dept Fax: [phone]      Orthopaedic Trauma Clinic Follow Up      Subjective:   Date of Injury: 1/27/22    Edward Barker is a 66y.o. year old female who presents to the clinic today for routine follow up left distal radius fracture being treated conservatively with date of injury listed above. Patient doing well today minimal pain to the left wrist.  Patient has maintained her cast as well as nonweightbearing restrictions. Patient denies any distal numbness or tingling to the left upper extremity. Patient denies further complaints    Review of Systems  Gen: no fever, chills, malaise  CV: no chest pain or palpitations  Resp: no cough or shortness of breath  GI: no nausea, vomiting, diarrhea, or constipation  Neuro: no seizures, vertigo, or headache  Msk: Minimal left wrist pain  10 remaining systems reviewed and negative    Objective : There were no vitals filed for this visit. Body mass index is 33.89 kg/m². General: No acute distress, resting comfortably in the clinic  Neuro: alert. oriented  Eyes: Extra-ocular muscles intact  Pulm: Respirations unlabored and regular. Skin: warm, well perfused  Psych:   Patient has good fund of knowledge and displays understanding of exam, diagnosis, and plan. MSK: Left upper extremity: Cast removed. Dry skin located under prior location of cast.  Minimal tenderness palpation about the fracture site of the distal radius. Pronation 45 degrees, supination 10 degrees, flexion 45 degrees, extension 10 degrees. Neurovascular intact distally in median, ulnar, radial nerve distributions.   Brisk capillary refill to all digits and 2+ radial artery    Radiology:  History: Left distal radius fracture, date of injury 1/27/2022    Comparison: 2/22/2022    Findings: 3 views of the left wrist (AP, oblique, lateral) in a skeletally mature patient showing healing distal radius fracture with decreased fracture lines and increased callus as well as maintained radial height, radial inclination, neutral to mild dorsal tilt. Impression: Healing left distal radius fracture with maintained alignment. Assessment:   66y.o. year old female with healing left distal radius fracture, date of injury 1/27/2022  Plan:   Reviewed clinical exam findings and imaging with patient in the office today. Patient doing well overall. Cast removed in the office. Patient transition to removable wrist brace at this time. Reviewed how she can wear the wrist brace most of the day including at night at this point however she should come out of the wrist brace and work on range of motion exercises. Reviewed active assist wrist range of motion exercises and gave her a sheet for instructions. Patient is able to start weightbearing through the left wrist.  Patient demonstrates understanding. All final questions and concerns addressed. Patient will follow up in 6 weeks time with repeat x-rays as well as range of motion check. Follow up:No follow-ups on file. No orders of the defined types were placed in this encounter. No orders of the defined types were placed in this encounter. Electronically signed by Twin Lopez DO on 3/15/2022 at 9:29 AM    This note is created with the assistance of a speech recognition program.  While intending to generate a document that actually reflects the content of the visit, the document can still have some errors including those of syntax and sound a like substitutions which may escape proof reading.   In such instances, actual meaning can be extrapolated by contextual diversion

## 2022-04-11 ENCOUNTER — TELEPHONE (OUTPATIENT)
Dept: ORTHOPEDIC SURGERY | Age: 72
End: 2022-04-11

## 2022-04-11 NOTE — TELEPHONE ENCOUNTER
pts daughter called in stating she would like to have pts appt moved to this week with dr Jj Victor due to pts physical therapist has concerns with pts formation; pts daughter states her mother has been in a lot of pain lately and she is concerned please contact pts daughter at [phone]

## 2022-06-16 ENCOUNTER — TELEPHONE (OUTPATIENT)
Dept: RADIATION ONCOLOGY | Facility: MEDICAL CENTER | Age: 72
End: 2022-06-16

## 2022-06-30 ENCOUNTER — TELEPHONE (OUTPATIENT)
Dept: INTERVENTIONAL RADIOLOGY/VASCULAR | Age: 72
End: 2022-06-30

## 2022-06-30 LAB — SARS-COV-2: DETECTED

## 2022-06-30 NOTE — TELEPHONE ENCOUNTER
Attempted to contact pts daughter to scheduled bmbx. LM advising pt to contact [phone] to schedule.

## 2022-07-04 ENCOUNTER — APPOINTMENT (OUTPATIENT)
Dept: CT IMAGING | Facility: CLINIC | Age: 72
DRG: 177 | End: 2022-07-04
Payer: MEDICARE

## 2022-07-04 ENCOUNTER — HOSPITAL ENCOUNTER (EMERGENCY)
Facility: CLINIC | Age: 72
Discharge: HOME | DRG: 177 | End: 2022-07-04
Attending: EMERGENCY MEDICINE
Payer: MEDICARE

## 2022-07-04 VITALS
RESPIRATION RATE: 16 BRPM | HEIGHT: 66 IN | SYSTOLIC BLOOD PRESSURE: 174 MMHG | BODY MASS INDEX: 33.75 KG/M2 | DIASTOLIC BLOOD PRESSURE: 69 MMHG | HEART RATE: 94 BPM | OXYGEN SATURATION: 98 % | WEIGHT: 210 LBS | TEMPERATURE: 98.5 F

## 2022-07-04 DIAGNOSIS — U07.1 LOWER RESPIRATORY TRACT INFECTION DUE TO COVID-19 VIRUS: Primary | ICD-10-CM

## 2022-07-04 DIAGNOSIS — J22 LOWER RESPIRATORY TRACT INFECTION DUE TO COVID-19 VIRUS: Primary | ICD-10-CM

## 2022-07-04 LAB
ABSOLUTE EOS #: 0.01 K/UL (ref 0–0.4)
ABSOLUTE LYMPH #: 0.66 K/UL (ref 1–4.8)
ABSOLUTE MONO #: 0.12 K/UL (ref 0.1–0.8)
ANION GAP SERPL CALCULATED.3IONS-SCNC: 13 MMOL/L (ref 9–17)
ATYPICAL LYMPHOCYTE ABSOLUTE COUNT: 0.06 K/UL
ATYPICAL LYMPHOCYTES: 7 %
BASOPHILS # BLD: 0 % (ref 0–2)
BASOPHILS ABSOLUTE: 0 K/UL (ref 0–0.2)
BUN BLDV-MCNC: 12 MG/DL (ref 8–23)
CALCIUM SERPL-MCNC: 8.7 MG/DL (ref 8.6–10.4)
CHLORIDE BLD-SCNC: 99 MMOL/L (ref 98–107)
CO2: 26 MMOL/L (ref 20–31)
CREAT SERPL-MCNC: 0.6 MG/DL (ref 0.5–0.9)
D-DIMER QUANTITATIVE: 0.79 MG/L FEU
EOSINOPHILS RELATIVE PERCENT: 1 % (ref 1–4)
GFR AFRICAN AMERICAN: >60 ML/MIN
GFR NON-AFRICAN AMERICAN: >60 ML/MIN
GFR SERPL CREATININE-BSD FRML MDRD: ABNORMAL ML/MIN/{1.73_M2}
GLUCOSE BLD-MCNC: 147 MG/DL (ref 70–99)
HCT VFR BLD CALC: 40.2 % (ref 36–46)
HEMOGLOBIN: 13.2 G/DL (ref 12–16)
LYMPHOCYTES # BLD: 73 % (ref 24–44)
MCH RBC QN AUTO: 29 PG (ref 26–34)
MCHC RBC AUTO-ENTMCNC: 32.8 G/DL (ref 31–37)
MCV RBC AUTO: 88.4 FL (ref 80–100)
MONOCYTES # BLD: 13 % (ref 1–7)
MORPHOLOGY: ABNORMAL
MORPHOLOGY: ABNORMAL
PDW BLD-RTO: 14.3 % (ref 12.5–15.4)
PLATELET # BLD: 194 K/UL (ref 140–450)
PMV BLD AUTO: 6.9 FL (ref 6–12)
POTASSIUM SERPL-SCNC: 3.7 MMOL/L (ref 3.7–5.3)
PRO-BNP: 811 PG/ML
RBC # BLD: 4.55 M/UL (ref 4–5.2)
SEG NEUTROPHILS: 6 % (ref 36–66)
SEGMENTED NEUTROPHILS ABSOLUTE COUNT: 0.05 K/UL (ref 1.8–7.7)
SODIUM BLD-SCNC: 138 MMOL/L (ref 135–144)
TROPONIN, HIGH SENSITIVITY: 18 NG/L (ref 0–14)
WBC # BLD: 0.9 K/UL (ref 3.5–11)

## 2022-07-04 PROCEDURE — 80048 BASIC METABOLIC PNL TOTAL CA: CPT

## 2022-07-04 PROCEDURE — 96375 TX/PRO/DX INJ NEW DRUG ADDON: CPT

## 2022-07-04 PROCEDURE — 71260 CT THORAX DX C+: CPT | Performed by: EMERGENCY MEDICINE

## 2022-07-04 PROCEDURE — 96374 THER/PROPH/DIAG INJ IV PUSH: CPT

## 2022-07-04 PROCEDURE — 84484 ASSAY OF TROPONIN QUANT: CPT

## 2022-07-04 PROCEDURE — 99285 EMERGENCY DEPT VISIT HI MDM: CPT

## 2022-07-04 PROCEDURE — 85025 COMPLETE CBC W/AUTO DIFF WBC: CPT

## 2022-07-04 PROCEDURE — 6370000000 HC RX 637 (ALT 250 FOR IP): Performed by: EMERGENCY MEDICINE

## 2022-07-04 PROCEDURE — 36415 COLL VENOUS BLD VENIPUNCTURE: CPT

## 2022-07-04 PROCEDURE — 6360000002 HC RX W HCPCS: Performed by: EMERGENCY MEDICINE

## 2022-07-04 PROCEDURE — 2580000003 HC RX 258: Performed by: EMERGENCY MEDICINE

## 2022-07-04 PROCEDURE — 85379 FIBRIN DEGRADATION QUANT: CPT

## 2022-07-04 PROCEDURE — 83880 ASSAY OF NATRIURETIC PEPTIDE: CPT

## 2022-07-04 PROCEDURE — 6360000004 HC RX CONTRAST MEDICATION: Performed by: EMERGENCY MEDICINE

## 2022-07-04 RX ORDER — ONDANSETRON 2 MG/ML
4 INJECTION INTRAMUSCULAR; INTRAVENOUS ONCE
Status: COMPLETED | OUTPATIENT
Start: 2022-07-04 | End: 2022-07-04

## 2022-07-04 RX ORDER — 0.9 % SODIUM CHLORIDE 0.9 %
70 INTRAVENOUS SOLUTION INTRAVENOUS ONCE
Status: COMPLETED | OUTPATIENT
Start: 2022-07-04 | End: 2022-07-04

## 2022-07-04 RX ORDER — BENZONATATE 100 MG/1
200 CAPSULE ORAL ONCE
Status: COMPLETED | OUTPATIENT
Start: 2022-07-04 | End: 2022-07-04

## 2022-07-04 RX ORDER — AZITHROMYCIN 500 MG/1
500 TABLET, FILM COATED ORAL DAILY
Qty: 5 TABLET | Refills: 0 | Status: SHIPPED | OUTPATIENT
Start: 2022-07-04 | End: 2022-07-09

## 2022-07-04 RX ORDER — AZITHROMYCIN 250 MG/1
500 TABLET, FILM COATED ORAL ONCE
Status: COMPLETED | OUTPATIENT
Start: 2022-07-04 | End: 2022-07-04

## 2022-07-04 RX ORDER — KETOROLAC TROMETHAMINE 15 MG/ML
15 INJECTION, SOLUTION INTRAMUSCULAR; INTRAVENOUS ONCE
Status: COMPLETED | OUTPATIENT
Start: 2022-07-04 | End: 2022-07-04

## 2022-07-04 RX ORDER — SODIUM CHLORIDE 0.9 % (FLUSH) 0.9 %
10 SYRINGE (ML) INJECTION PRN
Status: DISCONTINUED | OUTPATIENT
Start: 2022-07-04 | End: 2022-07-04 | Stop reason: HOSPADM

## 2022-07-04 RX ORDER — BENZONATATE 100 MG/1
100 CAPSULE ORAL 3 TIMES DAILY PRN
Qty: 21 CAPSULE | Refills: 0 | Status: SHIPPED | OUTPATIENT
Start: 2022-07-04 | End: 2022-07-11

## 2022-07-04 RX ORDER — IPRATROPIUM BROMIDE AND ALBUTEROL SULFATE 2.5; .5 MG/3ML; MG/3ML
1 SOLUTION RESPIRATORY (INHALATION) ONCE
Status: COMPLETED | OUTPATIENT
Start: 2022-07-04 | End: 2022-07-04

## 2022-07-04 RX ADMIN — SODIUM CHLORIDE 70 ML: 9 INJECTION, SOLUTION INTRAVENOUS at 03:58

## 2022-07-04 RX ADMIN — IPRATROPIUM BROMIDE AND ALBUTEROL SULFATE 1 AMPULE: 2.5; .5 SOLUTION RESPIRATORY (INHALATION) at 03:11

## 2022-07-04 RX ADMIN — AZITHROMYCIN MONOHYDRATE 500 MG: 250 TABLET ORAL at 05:31

## 2022-07-04 RX ADMIN — BENZONATATE 200 MG: 100 CAPSULE ORAL at 03:11

## 2022-07-04 RX ADMIN — WATER 1000 MG: 1 INJECTION INTRAMUSCULAR; INTRAVENOUS; SUBCUTANEOUS at 05:32

## 2022-07-04 RX ADMIN — SODIUM CHLORIDE, PRESERVATIVE FREE 10 ML: 5 INJECTION INTRAVENOUS at 04:19

## 2022-07-04 RX ADMIN — KETOROLAC TROMETHAMINE 15 MG: 15 INJECTION, SOLUTION INTRAMUSCULAR; INTRAVENOUS at 04:15

## 2022-07-04 RX ADMIN — ONDANSETRON 4 MG: 2 INJECTION INTRAMUSCULAR; INTRAVENOUS at 04:15

## 2022-07-04 RX ADMIN — IOPAMIDOL 80 ML: 755 INJECTION, SOLUTION INTRAVENOUS at 04:19

## 2022-07-04 ASSESSMENT — PAIN - FUNCTIONAL ASSESSMENT: PAIN_FUNCTIONAL_ASSESSMENT: NONE - DENIES PAIN

## 2022-07-04 NOTE — ED PROVIDER NOTES
[facility]  [address] [facility]  Phone: [phone]        Pt Name: Erin Henriquez  MRN: [id number]  [place] 1950  Date of evaluation: 7/4/22      CHIEF COMPLAINT     Chief Complaint   Patient presents with    Cough    Positive For Covid-19         HISTORY OF PRESENT ILLNESS  (Location/Symptom, Timing/Onset, Context/Setting, Quality, Duration, Modifying Factors, Severity.)    Erin Henriquez is a 66 y.o. female who presents with cough and shortness of breath. Patient was diagnosed to be COVID-positive on 6/28/2022. He was seen at [facility] emergency department. Patient was admitted to [facility]. Patient got frustrated with the amount of time that she was there and nobody came into the room to see her so she signed herself out that same night. Her primary care physician started her on Paxil bid. She has 2 days left to take. This evening she had a persistent cough that it made her feel like she was having more difficulty breathing. Patient is on oxygen at 2 L at night when she sleeps. Does use an albuterol inhaler at home but and does not have a nebulizer machine. She denies any fever or chills. Denies any sore throat. He denies any chest pain. States she has some discomfort in the abdomen secondary to the coughing. She denies any calf pain or swelling of the lower extremities. REVIEW OF SYSTEMS    (2-9 systems for level 4, 10 or more for level 5)     Review of Systems is reviewed and are negative except was present in the HPI    [address]    has a past medical history of Cancer ([address] Hypertension, Kidney stones, Oxygen desaturation during sleep, and Pulmonary embolism ([address] SURGICAL HISTORY      has a past surgical history that includes Cholecystectomy; Rotator cuff repair (Bilateral); Lithotripsy; Hysterectomy; Breast surgery; and joint replacement (Right, 01/13/2020).     CURRENTMEDICATIONS       Previous Medications    AMLODIPINE (NORVASC) 5 MG TABLET    Take 5 mg by mouth daily    APIXABAN (ELIQUIS) 5 MG TABS TABLET    Take 5 mg by mouth 2 times daily    CHOLECALCIFEROL (VITAMIN D3) 1.25 MG (82591 UT) CAPS    Take by mouth as needed    CITALOPRAM (CELEXA) 40 MG TABLET    Take 40 mg by mouth daily    DIAZEPAM (VALIUM) 5 MG TABLET    Take 5 mg by mouth as needed for Anxiety. Indications: 60 mins prior to injection therapy    FERROUS SULFATE (IRON 325) 325 (65 FE) MG TABLET    Take 325 mg by mouth daily (with breakfast)    GABAPENTIN (NEURONTIN) 300 MG CAPSULE    Take 300 mg by mouth as needed. LOSARTAN (COZAAR) 50 MG TABLET    Take 50 mg by mouth daily    OMEPRAZOLE (PRILOSEC) 20 MG DELAYED RELEASE CAPSULE    Take 20 mg by mouth as needed    ROPINIROLE (REQUIP) 1 MG TABLET    Take 1 mg by mouth 2 times daily       ALLERGIES     is allergic to crestor [rosuvastatin], lisinopril, and pcn [penicillins]. FAMILY HISTORY     She indicated that both of her sisters are . She indicated that all of her three brothers are . She indicated that both of her nieces are . She indicated that two of her four nephews are alive. family history includes Cancer in her brother, brother, brother, nephew, nephew, nephew, nephew, niece, niece, sister, and sister. SOCIAL HISTORY      reports that she has quit smoking. She has never used smokeless tobacco. She reports previous alcohol use. She reports previous drug use. PHYSICAL EXAM    (up to 7 for level 4, 8 or more for level 5)   INITIAL VITALS:  height is 5' 6\" (1.676 m) and weight is 95.3 kg (210 lb). Her oral temperature is 98.5 °F (36.9 °C). Her blood pressure is 174/69 (abnormal) and her pulse is 94. Her respiration is 16 and oxygen saturation is 98%. Physical Exam  Vitals reviewed. Constitutional:       General: She is in acute distress. Comments: Patient is in mild distress secondary to persistent cough. O2 sats are 97% on room air.    HENT:      Head: Normocephalic and atraumatic. Eyes:      Extraocular Movements: Extraocular movements intact. Pupils: Pupils are equal, round, and reactive to light. Cardiovascular:      Rate and Rhythm: Normal rate and regular rhythm. Pulses: Normal pulses. Heart sounds: Normal heart sounds. Pulmonary:      Effort: Respiratory distress present. No tachypnea, bradypnea, accessory muscle usage, prolonged expiration or retractions. Breath sounds: Wheezing present. Comments: Mild expiratory wheezing bilateral.  Patient is able to talk in full sentences but does have a persistent cough. Abdominal:      General: Bowel sounds are normal.      Palpations: Abdomen is soft. Tenderness: There is abdominal tenderness in the epigastric area. There is no right CVA tenderness, left CVA tenderness, guarding or rebound. Negative signs include Ko's sign, Rovsing's sign and McBurney's sign. Comments: Mild epigastric tenderness. This tenderness is secondary to persistent coughing. Musculoskeletal:      Cervical back: Normal range of motion and neck supple. No rigidity. Lymphadenopathy:      Cervical: No cervical adenopathy. Skin:     General: Skin is warm. Capillary Refill: Capillary refill takes less than 2 seconds. Findings: No rash. Neurological:      General: No focal deficit present. Mental Status: She is alert. GCS: GCS eye subscore is 4. GCS verbal subscore is 5. GCS motor subscore is 6. Sensory: Sensation is intact. Motor: Motor function is intact. Gait: Gait is intact. Psychiatric:         Mood and Affect: Mood normal.         Thought Content: Thought content normal.         Judgment: Judgment normal.         DIFFERENTIAL DIAGNOSIS/ MDM:     Positive COVID with some shortness of breath and wheezing. Possible COVID-pneumonia. We will check a D-dimer for possible pulmonary embolus.     DIAGNOSTIC RESULTS     EKG: All EKG's are interpreted by the Emergency Department Physician who either signs or Co-signs this chart in the absence of a cardiologist.        RADIOLOGY:        Interpretation per the Radiologist below, if available at the time of this note:      CT CHEST PULMONARY EMBOLISM W CONTRAST    Result Date: 7/4/2022  EXAMINATION: CTA OF THE CHEST 7/4/2022 3:41 am TECHNIQUE: CTA of the chest was performed after the administration of intravenous contrast.  Multiplanar reformatted images are provided for review. MIP images are provided for review. Automated exposure control, iterative reconstruction, and/or weight based adjustment of the mA/kV was utilized to reduce the radiation dose to as low as reasonably achievable. COMPARISON: None. HISTORY: ORDERING SYSTEM PROVIDED HISTORY: COVID +, D Dimer +. SOB TECHNOLOGIST PROVIDED HISTORY: COVID +, D Dimer +. SOB Decision Support Exception - unselect if not a suspected or confirmed emergency medical condition->Emergency Medical Condition (MA) Reason for Exam: Pt. C/o cough and SOB; Covid Positive 6/28/22. Pt. Is on 2L of home oxygen at night. Pt. Is immunocompromised with a very low WBC. Hx PE.   GFR: >60CR: 0.60 FINDINGS: Pulmonary Arteries: Pulmonary arteries are adequately opacified for evaluation. No evidence of intraluminal filling defect to suggest pulmonary embolism. Main pulmonary artery is normal in caliber. Mediastinum: The heart is enlarged. No pericardial effusion. No lymphadenopathy by CT criteria. Normal caliber thoracic aorta. Atherosclerosis. Lungs/pleura: Small consolidation in the left lower lobe concerning for pneumonia. No pleural effusion or pneumothorax. Subtle emphysema. Upper Abdomen: The small hiatal hernia. No acute findings in the upper abdomen. Soft Tissues/Bones: No acute findings within the soft tissues or osseous structures. No evidence of pulmonary embolism. Mild left lower lobe pneumonia.        LABS:  Results for orders placed or performed during the hospital encounter of 07/04/22 CBC with Auto Differential   Result Value Ref Range    WBC 0.9 (LL) 3.5 - 11.0 k/uL    RBC 4.55 4.0 - 5.2 m/uL    Hemoglobin 13.2 12.0 - 16.0 g/dL    Hematocrit 40.2 36 - 46 %    MCV 88.4 80 - 100 fL    MCH 29.0 26 - 34 pg    MCHC 32.8 31 - 37 g/dL    RDW 14.3 12.5 - 15.4 %    Platelets 483 038 - 741 k/uL    MPV 6.9 6.0 - 12.0 fL    Seg Neutrophils 6 (L) 36 - 66 %    Lymphocytes 73 (H) 24 - 44 %    Atypical Lymphocytes 7 %    Monocytes 13 (H) 1 - 7 %    Eosinophils % 1 1 - 4 %    Basophils 0 0 - 2 %    Segs Absolute 0.05 (LL) 1.8 - 7.7 k/uL    Absolute Lymph # 0.66 (L) 1.0 - 4.8 k/uL    Atypical Lymphocytes Absolute 0.06 k/uL    Absolute Mono # 0.12 0.1 - 0.8 k/uL    Absolute Eos # 0.01 0.0 - 0.4 k/uL    Basophils Absolute 0.00 0.0 - 0.2 k/uL    Morphology ANISOCYTOSIS PRESENT     Morphology LARGE PLATELETS PRESENT    Basic Metabolic Panel w/ Reflex to MG   Result Value Ref Range    Glucose 147 (H) 70 - 99 mg/dL    BUN 12 8 - 23 mg/dL    CREATININE 0.60 0.50 - 0.90 mg/dL    Calcium 8.7 8.6 - 10.4 mg/dL    Sodium 138 135 - 144 mmol/L    Potassium 3.7 3.7 - 5.3 mmol/L    Chloride 99 98 - 107 mmol/L    CO2 26 20 - 31 mmol/L    Anion Gap 13 9 - 17 mmol/L    GFR Non-African American >60 >60 mL/min    GFR African American >60 >60 mL/min    GFR Comment         Troponin   Result Value Ref Range    Troponin, High Sensitivity 18 (H) 0 - 14 ng/L   D-Dimer, Quantitative   Result Value Ref Range    D-Dimer, Quant 0.79 mg/L FEU   Brain Natriuretic Peptide   Result Value Ref Range    Pro- (H) <300 pg/mL           EMERGENCY DEPARTMENT COURSE:   Vitals:    Vitals:    07/04/22 0248 07/04/22 0555 07/04/22 0616   BP: (!) 167/78 (!) 174/69 (!) 174/69   Pulse: 100  94   Resp: 20  16   Temp: 98.5 °F (36.9 °C)     TempSrc: Oral     SpO2: 93% 98% 98%   Weight: 95.3 kg (210 lb)     Height: 5' 6\" (1.676 m)       -------------------------  BP: (!) 174/69, Temp: 98.5 °F (36.9 °C), Heart Rate: 94, Resp: 16      RE-EVALUATION:  5:01 AM EDT patient is breathing much better. O2 sats 98%. She is feeling much better and is comfortable going home. Her CT scan shows no evidence of a PE but she does have a small consolidation left lower lobe concerning for pneumonia. We will go ahead and give her IV Rocephin 1 g and some oral Zithromax 500 mg. She does not have a ride home at this time we will keep her here in the emergency department monitoring her breathing and send her home in the morning as long as she is not having increased respiratory distress. His white blood cell count was 0.5 when she was seen at [place] [address] on 6/30/2022. Patient states she has an autoimmune disease with chronic low white blood cell counts. Her white blood cell count on 4/26/2022 was 1.9. This is a chronic problem that does put her at higher risk with having COVID but she is currently starting day 4 of Paxlovid treatment. CONSULTS:      PROCEDURES:  None    FINAL IMPRESSION      1. Lower respiratory tract infection due to COVID-19 virus          DISPOSITION/PLAN   DISPOSITION        CONDITION ON DISPOSITION:   Improved    PATIENT REFERRED TO:  Ruddy Trivedi MD  [address] [place] [address]  [phone]    Call in 1 day        DISCHARGE MEDICATIONS:  New Prescriptions    AZITHROMYCIN (ZITHROMAX) 500 MG TABLET    Take 1 tablet by mouth daily for 5 days    BENZONATATE (TESSALON) 100 MG CAPSULE    Take 1 capsule by mouth 3 times daily as needed for Cough       (Please note that portions of this note were completed with a voicerecognition program.  Efforts were made to edit the dictations but occasionally words are mis-transcribed. )    Tyson Shelton DO, , MD, F.A.C.E.P.   Attending Emergency Medicine Physician        Mandi Nunez DO  07/04/22 1634

## 2022-07-04 NOTE — ED TRIAGE NOTES
Patient states she was seen at [facility] and left AMA at midnight. Patient states she is here for uncontrolled cough. Patient states she was dx with covid on Tuesday and started on meds.

## 2022-07-05 ENCOUNTER — CARE COORDINATION (OUTPATIENT)
Dept: CARE COORDINATION | Age: 72
End: 2022-07-05

## 2022-07-05 NOTE — CARE COORDINATION
Patient contacted regarding COVID-19 diagnosis. Discussed COVID-19 related testing which was available at this time. Test results were positive. Patient informed of results, if available? Yes. Ambulatory Care Manager contacted the patient by telephone to perform post discharge assessment. Call within 2 business days of discharge: Yes. Verified name and  with patient as identifiers. Provided introduction to self, and explanation of the CTN/ACM role, and reason for call due to risk factors for infection and/or exposure to COVID-19. Symptoms reviewed with patient who verbalized the following symptoms: fatigue  cough  shortness of breath. Due to no new or worsening symptoms encounter was not routed to provider for escalation. Discussed follow-up appointments. If no appointment was previously scheduled, appointment scheduling offered: No.  [facility] follow up appointment(s):   Future Appointments   Date Time Provider Estella Monique   2022 10:00 AM STA CT SCAN RM 2 STAZ CT SCAN STA Radiolog     Non-[facility] follow up appointment(s):     Non-face-to-face services provided:  Obtained and reviewed discharge summary and/or continuity of care documents     Advance Care Planning:   Does patient have an Advance Directive:  not on file. Educated patient about risk for severe COVID-19 due to risk factors according to CDC guidelines. ACM reviewed discharge instructions, medical action plan and red flag symptoms with the patient who verbalized understanding. Discussed COVID vaccination status: Yes. Education provided on COVID-19 vaccination as appropriate. Discussed exposure protocols and quarantine with CDC Guidelines. Patient was given an opportunity to verbalize any questions and concerns and agrees to contact ACM or health care provider for questions related to their healthcare.     Reviewed and educated patient on any new and changed medications related to discharge diagnosis     Was patient discharged with a Problem: Goal Outcome Summary  Goal: Goal Outcome Summary  Outcome: Improving  No complaints of pain today   ceftin and prednisone given  Allopurinol, uric acid and calcium levels stable   Up with SBA, ambulating in hallway  Stable on RA   HTN in AM but resolved on its own   [facility]            pulse oximeter? no      ACM provided contact information. Plan for follow-up call in 5-7 days based on severity of symptoms and risk factors.     Additional at home COVID tests ordered for patient from [facility]

## 2022-07-06 ENCOUNTER — APPOINTMENT (OUTPATIENT)
Dept: GENERAL RADIOLOGY | Age: 72
DRG: 177 | End: 2022-07-06
Payer: MEDICARE

## 2022-07-06 ENCOUNTER — HOSPITAL ENCOUNTER (INPATIENT)
Age: 72
LOS: 3 days | Discharge: HOME | DRG: 177 | End: 2022-07-09
Attending: EMERGENCY MEDICINE | Admitting: INTERNAL MEDICINE
Payer: MEDICARE

## 2022-07-06 DIAGNOSIS — U07.1 COVID-19: Primary | ICD-10-CM

## 2022-07-06 DIAGNOSIS — E86.0 DEHYDRATION: ICD-10-CM

## 2022-07-06 DIAGNOSIS — U07.1 COVID-19 VIRUS INFECTION: ICD-10-CM

## 2022-07-06 DIAGNOSIS — R09.02 HYPOXIA: ICD-10-CM

## 2022-07-06 PROBLEM — J18.9 PNEUMONIA: Status: ACTIVE | Noted: 2022-07-06

## 2022-07-06 LAB
ABSOLUTE EOS #: 0 K/UL (ref 0–0.4)
ABSOLUTE IMMATURE GRANULOCYTE: 0.03 K/UL (ref 0–0.3)
ABSOLUTE LYMPH #: 0.77 K/UL (ref 1–4.8)
ABSOLUTE MONO #: 0.07 K/UL (ref 0.2–0.8)
ANION GAP SERPL CALCULATED.3IONS-SCNC: 10 MMOL/L (ref 9–17)
ATYPICAL LYMPHOCYTE ABSOLUTE COUNT: 0.05 K/UL
ATYPICAL LYMPHOCYTES: 5 %
BASOPHILS # BLD: 0 %
BASOPHILS ABSOLUTE: 0 K/UL (ref 0–0.2)
BUN BLDV-MCNC: 17 MG/DL (ref 8–23)
BUN/CREAT BLD: 24 (ref 9–20)
CALCIUM SERPL-MCNC: 9.1 MG/DL (ref 8.6–10.4)
CHLORIDE BLD-SCNC: 98 MMOL/L (ref 98–107)
CO2: 28 MMOL/L (ref 20–31)
CREAT SERPL-MCNC: 0.72 MG/DL (ref 0.5–0.9)
EOSINOPHILS RELATIVE PERCENT: 0 % (ref 1–4)
GFR AFRICAN AMERICAN: >60 ML/MIN
GFR NON-AFRICAN AMERICAN: >60 ML/MIN
GFR SERPL CREATININE-BSD FRML MDRD: ABNORMAL ML/MIN/{1.73_M2}
GLUCOSE BLD-MCNC: 116 MG/DL (ref 70–99)
HCT VFR BLD CALC: 43.8 % (ref 36.3–47.1)
HEMOGLOBIN: 13.8 G/DL (ref 11.9–15.1)
IMMATURE GRANULOCYTES: 3 %
LACTIC ACID, SEPSIS: 1 MMOL/L (ref 0.5–1.9)
LACTIC ACID, SEPSIS: <0.2 MMOL/L (ref 0.5–1.9)
LYMPHOCYTES # BLD: 77 % (ref 24–44)
MCH RBC QN AUTO: 28.9 PG (ref 25.2–33.5)
MCHC RBC AUTO-ENTMCNC: 31.5 G/DL (ref 28.4–34.8)
MCV RBC AUTO: 91.6 FL (ref 82.6–102.9)
MONOCYTES # BLD: 7 % (ref 1–7)
NRBC AUTOMATED: 0 PER 100 WBC
PDW BLD-RTO: 13 % (ref 11.8–14.4)
PLATELET # BLD: 175 K/UL (ref 138–453)
PMV BLD AUTO: 9.7 FL (ref 8.1–13.5)
POTASSIUM SERPL-SCNC: 3.9 MMOL/L (ref 3.7–5.3)
PROCALCITONIN: 0.09 NG/ML
RBC # BLD: 4.78 M/UL (ref 3.95–5.11)
SEG NEUTROPHILS: 8 % (ref 36–66)
SEGMENTED NEUTROPHILS ABSOLUTE COUNT: 0.08 K/UL (ref 1.8–7.7)
SODIUM BLD-SCNC: 136 MMOL/L (ref 135–144)
WBC # BLD: 1 K/UL (ref 3.5–11.3)

## 2022-07-06 PROCEDURE — 2580000003 HC RX 258: Performed by: NURSE PRACTITIONER

## 2022-07-06 PROCEDURE — 96374 THER/PROPH/DIAG INJ IV PUSH: CPT

## 2022-07-06 PROCEDURE — 6370000000 HC RX 637 (ALT 250 FOR IP): Performed by: NURSE PRACTITIONER

## 2022-07-06 PROCEDURE — 6360000002 HC RX W HCPCS: Performed by: EMERGENCY MEDICINE

## 2022-07-06 PROCEDURE — 2580000003 HC RX 258: Performed by: EMERGENCY MEDICINE

## 2022-07-06 PROCEDURE — 80048 BASIC METABOLIC PNL TOTAL CA: CPT

## 2022-07-06 PROCEDURE — 6370000000 HC RX 637 (ALT 250 FOR IP): Performed by: EMERGENCY MEDICINE

## 2022-07-06 PROCEDURE — 85025 COMPLETE CBC W/AUTO DIFF WBC: CPT

## 2022-07-06 PROCEDURE — 93005 ELECTROCARDIOGRAM TRACING: CPT | Performed by: INTERNAL MEDICINE

## 2022-07-06 PROCEDURE — 83605 ASSAY OF LACTIC ACID: CPT

## 2022-07-06 PROCEDURE — 6360000002 HC RX W HCPCS: Performed by: NURSE PRACTITIONER

## 2022-07-06 PROCEDURE — 99285 EMERGENCY DEPT VISIT HI MDM: CPT

## 2022-07-06 PROCEDURE — 84145 PROCALCITONIN (PCT): CPT

## 2022-07-06 PROCEDURE — 71045 X-RAY EXAM CHEST 1 VIEW: CPT

## 2022-07-06 PROCEDURE — 2060000000 HC ICU INTERMEDIATE R&B

## 2022-07-06 RX ORDER — ONDANSETRON 2 MG/ML
4 INJECTION INTRAMUSCULAR; INTRAVENOUS ONCE
Status: COMPLETED | OUTPATIENT
Start: 2022-07-06 | End: 2022-07-06

## 2022-07-06 RX ORDER — CITALOPRAM 20 MG/1
40 TABLET ORAL DAILY
Status: DISCONTINUED | OUTPATIENT
Start: 2022-07-07 | End: 2022-07-09 | Stop reason: HOSPADM

## 2022-07-06 RX ORDER — SODIUM CHLORIDE 9 MG/ML
INJECTION, SOLUTION INTRAVENOUS CONTINUOUS
Status: DISCONTINUED | OUTPATIENT
Start: 2022-07-06 | End: 2022-07-07

## 2022-07-06 RX ORDER — LOSARTAN POTASSIUM 50 MG/1
50 TABLET ORAL DAILY
Status: DISCONTINUED | OUTPATIENT
Start: 2022-07-07 | End: 2022-07-09 | Stop reason: HOSPADM

## 2022-07-06 RX ORDER — METHYLPREDNISOLONE SODIUM SUCCINATE 40 MG/ML
40 INJECTION, POWDER, LYOPHILIZED, FOR SOLUTION INTRAMUSCULAR; INTRAVENOUS EVERY 6 HOURS
Status: DISCONTINUED | OUTPATIENT
Start: 2022-07-06 | End: 2022-07-07

## 2022-07-06 RX ORDER — ONDANSETRON 4 MG/1
4 TABLET, ORALLY DISINTEGRATING ORAL EVERY 8 HOURS PRN
Status: DISCONTINUED | OUTPATIENT
Start: 2022-07-06 | End: 2022-07-09 | Stop reason: HOSPADM

## 2022-07-06 RX ORDER — SODIUM CHLORIDE 9 MG/ML
INJECTION, SOLUTION INTRAVENOUS PRN
Status: DISCONTINUED | OUTPATIENT
Start: 2022-07-06 | End: 2022-07-09 | Stop reason: HOSPADM

## 2022-07-06 RX ORDER — SODIUM CHLORIDE 0.9 % (FLUSH) 0.9 %
5-40 SYRINGE (ML) INJECTION PRN
Status: DISCONTINUED | OUTPATIENT
Start: 2022-07-06 | End: 2022-07-09 | Stop reason: HOSPADM

## 2022-07-06 RX ORDER — AMLODIPINE BESYLATE 5 MG/1
5 TABLET ORAL DAILY
Status: DISCONTINUED | OUTPATIENT
Start: 2022-07-07 | End: 2022-07-09 | Stop reason: HOSPADM

## 2022-07-06 RX ORDER — POLYETHYLENE GLYCOL 3350 17 G/17G
17 POWDER, FOR SOLUTION ORAL DAILY PRN
Status: DISCONTINUED | OUTPATIENT
Start: 2022-07-06 | End: 2022-07-09 | Stop reason: HOSPADM

## 2022-07-06 RX ORDER — CODEINE PHOSPHATE AND GUAIFENESIN 10; 100 MG/5ML; MG/5ML
5 SOLUTION ORAL EVERY 4 HOURS PRN
Status: DISCONTINUED | OUTPATIENT
Start: 2022-07-06 | End: 2022-07-09 | Stop reason: HOSPADM

## 2022-07-06 RX ORDER — IPRATROPIUM BROMIDE AND ALBUTEROL SULFATE 2.5; .5 MG/3ML; MG/3ML
1 SOLUTION RESPIRATORY (INHALATION) 3 TIMES DAILY
Status: DISCONTINUED | OUTPATIENT
Start: 2022-07-07 | End: 2022-07-07

## 2022-07-06 RX ORDER — ACETAMINOPHEN 650 MG/1
650 SUPPOSITORY RECTAL EVERY 6 HOURS PRN
Status: DISCONTINUED | OUTPATIENT
Start: 2022-07-06 | End: 2022-07-09 | Stop reason: HOSPADM

## 2022-07-06 RX ORDER — LANOLIN ALCOHOL/MO/W.PET/CERES
325 CREAM (GRAM) TOPICAL
Status: DISCONTINUED | OUTPATIENT
Start: 2022-07-07 | End: 2022-07-09 | Stop reason: HOSPADM

## 2022-07-06 RX ORDER — ROPINIROLE 1 MG/1
1 TABLET, FILM COATED ORAL 2 TIMES DAILY
Status: DISCONTINUED | OUTPATIENT
Start: 2022-07-06 | End: 2022-07-09 | Stop reason: HOSPADM

## 2022-07-06 RX ORDER — ALBUTEROL SULFATE 2.5 MG/3ML
2.5 SOLUTION RESPIRATORY (INHALATION)
Status: DISCONTINUED | OUTPATIENT
Start: 2022-07-06 | End: 2022-07-09 | Stop reason: HOSPADM

## 2022-07-06 RX ORDER — SODIUM CHLORIDE 0.9 % (FLUSH) 0.9 %
5-40 SYRINGE (ML) INJECTION EVERY 12 HOURS SCHEDULED
Status: DISCONTINUED | OUTPATIENT
Start: 2022-07-06 | End: 2022-07-09 | Stop reason: HOSPADM

## 2022-07-06 RX ORDER — IPRATROPIUM BROMIDE AND ALBUTEROL SULFATE 2.5; .5 MG/3ML; MG/3ML
1 SOLUTION RESPIRATORY (INHALATION)
Status: DISCONTINUED | OUTPATIENT
Start: 2022-07-07 | End: 2022-07-06

## 2022-07-06 RX ORDER — BENZONATATE 100 MG/1
100 CAPSULE ORAL 3 TIMES DAILY PRN
Status: DISCONTINUED | OUTPATIENT
Start: 2022-07-06 | End: 2022-07-09 | Stop reason: HOSPADM

## 2022-07-06 RX ORDER — ONDANSETRON 2 MG/ML
4 INJECTION INTRAMUSCULAR; INTRAVENOUS EVERY 6 HOURS PRN
Status: DISCONTINUED | OUTPATIENT
Start: 2022-07-06 | End: 2022-07-09 | Stop reason: HOSPADM

## 2022-07-06 RX ORDER — PREDNISONE 20 MG/1
40 TABLET ORAL DAILY
Status: DISCONTINUED | OUTPATIENT
Start: 2022-07-09 | End: 2022-07-07

## 2022-07-06 RX ORDER — ACETAMINOPHEN 325 MG/1
650 TABLET ORAL EVERY 6 HOURS PRN
Status: DISCONTINUED | OUTPATIENT
Start: 2022-07-06 | End: 2022-07-09 | Stop reason: HOSPADM

## 2022-07-06 RX ADMIN — ONDANSETRON 4 MG: 2 INJECTION INTRAMUSCULAR; INTRAVENOUS at 15:56

## 2022-07-06 RX ADMIN — SODIUM CHLORIDE: 9 INJECTION, SOLUTION INTRAVENOUS at 22:24

## 2022-07-06 RX ADMIN — SODIUM CHLORIDE: 9 INJECTION, SOLUTION INTRAVENOUS at 19:10

## 2022-07-06 RX ADMIN — CEFTRIAXONE SODIUM 1000 MG: 1 INJECTION, POWDER, FOR SOLUTION INTRAMUSCULAR; INTRAVENOUS at 22:29

## 2022-07-06 RX ADMIN — APIXABAN 5 MG: 5 TABLET, FILM COATED ORAL at 22:26

## 2022-07-06 RX ADMIN — AZITHROMYCIN MONOHYDRATE 500 MG: 500 INJECTION, POWDER, LYOPHILIZED, FOR SOLUTION INTRAVENOUS at 23:06

## 2022-07-06 RX ADMIN — GUAIFENESIN AND CODEINE PHOSPHATE 5 ML: 10; 100 LIQUID ORAL at 15:56

## 2022-07-06 RX ADMIN — ROPINIROLE HYDROCHLORIDE 1 MG: 1 TABLET, FILM COATED ORAL at 22:26

## 2022-07-06 RX ADMIN — SODIUM CHLORIDE, PRESERVATIVE FREE 10 ML: 5 INJECTION INTRAVENOUS at 22:34

## 2022-07-06 RX ADMIN — METHYLPREDNISOLONE SODIUM SUCCINATE 40 MG: 40 INJECTION, POWDER, FOR SOLUTION INTRAMUSCULAR; INTRAVENOUS at 22:26

## 2022-07-06 ASSESSMENT — PAIN - FUNCTIONAL ASSESSMENT: PAIN_FUNCTIONAL_ASSESSMENT: PREVENTS OR INTERFERES SOME ACTIVE ACTIVITIES AND ADLS

## 2022-07-06 ASSESSMENT — ENCOUNTER SYMPTOMS
SORE THROAT: 0
VOMITING: 0
COUGH: 1
NAUSEA: 0
DIARRHEA: 0
COLOR CHANGE: 0
EYE REDNESS: 0
EYE DISCHARGE: 0
RHINORRHEA: 0
SHORTNESS OF BREATH: 0

## 2022-07-06 ASSESSMENT — PAIN DESCRIPTION - ONSET: ONSET: PROGRESSIVE

## 2022-07-06 ASSESSMENT — PAIN DESCRIPTION - PAIN TYPE: TYPE: ACUTE PAIN

## 2022-07-06 ASSESSMENT — PAIN SCALES - GENERAL: PAINLEVEL_OUTOF10: 6

## 2022-07-06 ASSESSMENT — PAIN DESCRIPTION - FREQUENCY: FREQUENCY: CONTINUOUS

## 2022-07-06 ASSESSMENT — PAIN DESCRIPTION - LOCATION: LOCATION: ABDOMEN

## 2022-07-06 ASSESSMENT — PAIN DESCRIPTION - DESCRIPTORS: DESCRIPTORS: SORE

## 2022-07-06 NOTE — ED PROVIDER NOTES
EMERGENCY DEPARTMENT ENCOUNTER    Pt Name: Gabriela Monaco  MRN: [id number]  [place] 1950  Date of evaluation: 7/6/22  CHIEF COMPLAINT       Chief Complaint   Patient presents with    Shortness of Breath     covid positive, pneumonia positive     HISTORY OF PRESENT ILLNESS   This is a 40-year-old female who presents with complaints of a cough. Patient was diagnosed with COVID recently, she presents today with complaints of continued cough, not tolerating oral intake. The patient states that she has been having nausea vomiting tolerating oral intake. Patient states that she was sent here to be evaluated by her PCP and her PCP would like her to be admitted to the hospital.              REVIEW OF SYSTEMS     Review of Systems   Constitutional: Negative for chills and fever. HENT: Negative for rhinorrhea and sore throat. Eyes: Negative for discharge, redness and visual disturbance. Respiratory: Positive for cough. Negative for shortness of breath. Cardiovascular: Negative for chest pain, palpitations and leg swelling. Gastrointestinal: Negative for diarrhea, nausea and vomiting. Genitourinary: Negative for dysuria and hematuria. Musculoskeletal: Negative for arthralgias, myalgias and neck pain. Skin: Negative for color change and rash. Neurological: Negative for seizures, weakness and headaches. Psychiatric/Behavioral: Negative for hallucinations, self-injury and suicidal ideas.      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, estrogen receptor positive ([facility] [address] C50.412, Z17.0     SURGICAL HISTORY       Past Surgical History:   Procedure Laterality Date    BREAST SURGERY      CHOLECYSTECTOMY      HYSTERECTOMY (CERVIX STATUS UNKNOWN)      JOINT REPLACEMENT Right 2020    LITHOTRIPSY      ROTATOR CUFF REPAIR Bilateral      CURRENT MEDICATIONS       Previous Medications    AMLODIPINE (NORVASC) 5 MG TABLET    Take 5 mg by mouth daily    APIXABAN (ELIQUIS) 5 MG TABS TABLET    Take 5 mg by mouth 2 times daily    AZITHROMYCIN (ZITHROMAX) 500 MG TABLET    Take 1 tablet by mouth daily for 5 days    BENZONATATE (TESSALON) 100 MG CAPSULE    Take 1 capsule by mouth 3 times daily as needed for Cough    CHOLECALCIFEROL (VITAMIN D3) 1.25 MG (77316 UT) CAPS    Take by mouth as needed    CITALOPRAM (CELEXA) 40 MG TABLET    Take 40 mg by mouth daily    DIAZEPAM (VALIUM) 5 MG TABLET    Take 5 mg by mouth as needed for Anxiety. Indications: 60 mins prior to injection therapy    FERROUS SULFATE (IRON 325) 325 (65 FE) MG TABLET    Take 325 mg by mouth daily (with breakfast)    GABAPENTIN (NEURONTIN) 300 MG CAPSULE    Take 300 mg by mouth as needed. LOSARTAN (COZAAR) 50 MG TABLET    Take 50 mg by mouth daily    OMEPRAZOLE (PRILOSEC) 20 MG DELAYED RELEASE CAPSULE    Take 20 mg by mouth as needed    ROPINIROLE (REQUIP) 1 MG TABLET    Take 1 mg by mouth 2 times daily     ALLERGIES     is allergic to crestor [rosuvastatin], lisinopril, and pcn [penicillins]. FAMILY HISTORY     She indicated that both of her sisters are . She indicated that all of her three brothers are . She indicated that both of her nieces are . She indicated that two of her four nephews are alive.      SOCIAL HISTORY       Social History     Tobacco Use    Smoking status: Former Smoker    Smokeless tobacco: Never Used    Tobacco comment: quit    Substance Use Topics    Alcohol use: Not Currently    Drug use: Not Currently     PHYSICAL EXAM     INITIAL VITALS: BP (!) 157/85   Pulse 95   Temp 98.6 °F (37 °C)   Resp 19   SpO2 96% Physical Exam  Constitutional:       Appearance: Normal appearance. She is well-developed. She is not ill-appearing or toxic-appearing. HENT:      Head: Normocephalic and atraumatic. Eyes:      Conjunctiva/sclera: Conjunctivae normal.      Pupils: Pupils are equal, round, and reactive to light. Neck:      Trachea: Trachea normal.   Cardiovascular:      Rate and Rhythm: Normal rate and regular rhythm. Heart sounds: S1 normal and S2 normal. No murmur heard. Pulmonary:      Effort: Pulmonary effort is normal. No accessory muscle usage or respiratory distress. Breath sounds: Normal breath sounds. Chest:      Chest wall: No deformity or tenderness. Abdominal:      General: Bowel sounds are normal. There is no distension or abdominal bruit. Palpations: Abdomen is not rigid. Tenderness: There is no abdominal tenderness. There is no guarding or rebound. Musculoskeletal:      Cervical back: Normal range of motion and neck supple. Skin:     General: Skin is warm. Findings: No rash. Neurological:      Mental Status: She is alert and oriented to person, place, and time. GCS: GCS eye subscore is 4. GCS verbal subscore is 5. GCS motor subscore is 6. Psychiatric:         Speech: Speech normal.         MEDICAL DECISION MAKIN-year-old female presents with complaints of COVID, she has a dry hacking cough, plan is basic labs chest x-ray and reevaluation. She just recently had an outpatient CT scan which showed evidence of a pneumonia with no evidence of a pulmonary embolism.          CRITICAL CARE:       PROCEDURES:    Procedures    DIAGNOSTIC RESULTS   EKG:All EKG's are interpreted by the Emergency Department Physician who either signs or Co-signs this chart in the absence of a cardiologist.        RADIOLOGY:All plain film, CT, MRI, and formal ultrasound images (except ED bedside ultrasound) are read by the radiologist, see reports below, unless otherwisenoted in MDM or here.  XR CHEST PORTABLE   Final Result   Unremarkable portable chest radiograph. LABS: All lab results were reviewed by myself, and all abnormals are listed below. Labs Reviewed   BASIC METABOLIC PANEL - Abnormal; Notable for the following components:       Result Value    Glucose 116 (*)     Bun/Cre Ratio 24 (*)     All other components within normal limits   CBC WITH AUTO DIFFERENTIAL - Abnormal; Notable for the following components:    WBC 1.0 (*)     Seg Neutrophils 8 (*)     Lymphocytes 77 (*)     Eosinophils % 0 (*)     Immature Granulocytes 3 (*)     Segs Absolute 0.08 (*)     Absolute Lymph # 0.77 (*)     Absolute Mono # 0.07 (*)     All other components within normal limits   LACTATE, SEPSIS - Abnormal; Notable for the following components:    Lactic Acid, Sepsis <0.2 (*)     All other components within normal limits   LACTATE, SEPSIS   PROCALCITONIN       EMERGENCY DEPARTMENTCOURSE:         Vitals:    Vitals:    07/06/22 1535 07/06/22 1545 07/06/22 1600   BP:  (!) 158/82 (!) 157/85   Pulse:  96 95   Resp:  22 19   Temp: 98.6 °F (37 °C)     SpO2:  98% 96%       The patient was given the following medications while in the emergency department:  Orders Placed This Encounter   Medications    guaiFENesin-codeine (GUAIFENESIN AC) 100-10 MG/5ML liquid 5 mL    ondansetron (ZOFRAN) injection 4 mg    0.9 % sodium chloride infusion     CONSULTS:  IP CONSULT TO HOSPITALIST    FINAL IMPRESSION      1. COVID-19    2. Dehydration          DISPOSITION/PLAN   DISPOSITION Decision To Admit 07/06/2022 07:03:42 PM      PATIENT REFERRED TO:  No follow-up provider specified. DISCHARGE MEDICATIONS:  New Prescriptions    No medications on file     The care is provided during an unprecedented national emergency due to the novel coronavirus, COVID 19.   MD Lynette Garcia MD  07/06/22 6943

## 2022-07-07 ENCOUNTER — APPOINTMENT (OUTPATIENT)
Dept: GENERAL RADIOLOGY | Age: 72
DRG: 177 | End: 2022-07-07
Payer: MEDICARE

## 2022-07-07 PROBLEM — I10 HYPERTENSION: Status: ACTIVE | Noted: 2022-07-07

## 2022-07-07 PROBLEM — R73.9 HYPERGLYCEMIA: Status: ACTIVE | Noted: 2022-07-07

## 2022-07-07 PROBLEM — C50.919 BREAST CANCER (HCC): Status: ACTIVE | Noted: 2022-07-07

## 2022-07-07 PROBLEM — R09.02 HYPOXIA: Status: ACTIVE | Noted: 2022-07-07

## 2022-07-07 PROBLEM — U07.1 COVID-19 VIRUS INFECTION: Status: ACTIVE | Noted: 2022-07-06

## 2022-07-07 PROBLEM — I26.99 PULMONARY EMBOLISM (HCC): Status: ACTIVE | Noted: 2022-07-07

## 2022-07-07 PROBLEM — T45.1X5A CHEMOTHERAPY-INDUCED NEUTROPENIA (HCC): Status: ACTIVE | Noted: 2022-07-07

## 2022-07-07 PROBLEM — D70.1 CHEMOTHERAPY-INDUCED NEUTROPENIA (HCC): Status: ACTIVE | Noted: 2022-07-07

## 2022-07-07 PROBLEM — D72.819 LEUKOPENIA: Status: ACTIVE | Noted: 2022-07-07

## 2022-07-07 LAB
ANION GAP SERPL CALCULATED.3IONS-SCNC: 8 MMOL/L (ref 9–17)
BUN BLDV-MCNC: 17 MG/DL (ref 8–23)
BUN/CREAT BLD: 22 (ref 9–20)
C-REACTIVE PROTEIN: 24.4 MG/L (ref 0–5)
CALCIUM SERPL-MCNC: 8.7 MG/DL (ref 8.6–10.4)
CHLORIDE BLD-SCNC: 101 MMOL/L (ref 98–107)
CO2: 28 MMOL/L (ref 20–31)
CREAT SERPL-MCNC: 0.76 MG/DL (ref 0.5–0.9)
EKG ATRIAL RATE: 96 BPM
EKG P AXIS: 38 DEGREES
EKG P-R INTERVAL: 132 MS
EKG Q-T INTERVAL: 384 MS
EKG QRS DURATION: 110 MS
EKG QTC CALCULATION (BAZETT): 485 MS
EKG R AXIS: 14 DEGREES
EKG T AXIS: 41 DEGREES
EKG VENTRICULAR RATE: 96 BPM
GFR AFRICAN AMERICAN: >60 ML/MIN
GFR NON-AFRICAN AMERICAN: >60 ML/MIN
GFR SERPL CREATININE-BSD FRML MDRD: ABNORMAL ML/MIN/{1.73_M2}
GLUCOSE BLD-MCNC: 140 MG/DL (ref 65–105)
GLUCOSE BLD-MCNC: 159 MG/DL (ref 65–105)
GLUCOSE BLD-MCNC: 252 MG/DL (ref 70–99)
POTASSIUM SERPL-SCNC: 4.1 MMOL/L (ref 3.7–5.3)
SODIUM BLD-SCNC: 137 MMOL/L (ref 135–144)

## 2022-07-07 PROCEDURE — 80048 BASIC METABOLIC PNL TOTAL CA: CPT

## 2022-07-07 PROCEDURE — 97162 PT EVAL MOD COMPLEX 30 MIN: CPT

## 2022-07-07 PROCEDURE — 36415 COLL VENOUS BLD VENIPUNCTURE: CPT

## 2022-07-07 PROCEDURE — 6370000000 HC RX 637 (ALT 250 FOR IP): Performed by: EMERGENCY MEDICINE

## 2022-07-07 PROCEDURE — 6370000000 HC RX 637 (ALT 250 FOR IP): Performed by: NURSE PRACTITIONER

## 2022-07-07 PROCEDURE — 2580000003 HC RX 258: Performed by: NURSE PRACTITIONER

## 2022-07-07 PROCEDURE — 97110 THERAPEUTIC EXERCISES: CPT

## 2022-07-07 PROCEDURE — 71045 X-RAY EXAM CHEST 1 VIEW: CPT

## 2022-07-07 PROCEDURE — 97166 OT EVAL MOD COMPLEX 45 MIN: CPT

## 2022-07-07 PROCEDURE — 97530 THERAPEUTIC ACTIVITIES: CPT

## 2022-07-07 PROCEDURE — 6370000000 HC RX 637 (ALT 250 FOR IP): Performed by: INTERNAL MEDICINE

## 2022-07-07 PROCEDURE — 94761 N-INVAS EAR/PLS OXIMETRY MLT: CPT

## 2022-07-07 PROCEDURE — 97116 GAIT TRAINING THERAPY: CPT

## 2022-07-07 PROCEDURE — 99223 1ST HOSP IP/OBS HIGH 75: CPT | Performed by: INTERNAL MEDICINE

## 2022-07-07 PROCEDURE — 86140 C-REACTIVE PROTEIN: CPT

## 2022-07-07 PROCEDURE — 2060000000 HC ICU INTERMEDIATE R&B

## 2022-07-07 PROCEDURE — 2700000000 HC OXYGEN THERAPY PER DAY

## 2022-07-07 PROCEDURE — 94640 AIRWAY INHALATION TREATMENT: CPT

## 2022-07-07 PROCEDURE — 82947 ASSAY GLUCOSE BLOOD QUANT: CPT

## 2022-07-07 PROCEDURE — 97535 SELF CARE MNGMENT TRAINING: CPT

## 2022-07-07 PROCEDURE — 6360000002 HC RX W HCPCS: Performed by: NURSE PRACTITIONER

## 2022-07-07 RX ORDER — INSULIN LISPRO 100 [IU]/ML
0-6 INJECTION, SOLUTION INTRAVENOUS; SUBCUTANEOUS
Status: DISCONTINUED | OUTPATIENT
Start: 2022-07-07 | End: 2022-07-09 | Stop reason: HOSPADM

## 2022-07-07 RX ORDER — INSULIN LISPRO 100 [IU]/ML
0-3 INJECTION, SOLUTION INTRAVENOUS; SUBCUTANEOUS NIGHTLY
Status: DISCONTINUED | OUTPATIENT
Start: 2022-07-07 | End: 2022-07-09 | Stop reason: HOSPADM

## 2022-07-07 RX ORDER — DEXTROSE MONOHYDRATE 50 MG/ML
100 INJECTION, SOLUTION INTRAVENOUS PRN
Status: DISCONTINUED | OUTPATIENT
Start: 2022-07-07 | End: 2022-07-09 | Stop reason: HOSPADM

## 2022-07-07 RX ORDER — FAMOTIDINE 20 MG/1
20 TABLET, FILM COATED ORAL 2 TIMES DAILY
Status: DISCONTINUED | OUTPATIENT
Start: 2022-07-07 | End: 2022-07-09 | Stop reason: HOSPADM

## 2022-07-07 RX ORDER — FAMOTIDINE 20 MG/1
20 TABLET, FILM COATED ORAL 2 TIMES DAILY
Status: DISCONTINUED | OUTPATIENT
Start: 2022-07-07 | End: 2022-07-07

## 2022-07-07 RX ORDER — IPRATROPIUM BROMIDE AND ALBUTEROL SULFATE 2.5; .5 MG/3ML; MG/3ML
1 SOLUTION RESPIRATORY (INHALATION) 2 TIMES DAILY
Status: DISCONTINUED | OUTPATIENT
Start: 2022-07-07 | End: 2022-07-09 | Stop reason: HOSPADM

## 2022-07-07 RX ORDER — PREDNISONE 20 MG/1
40 TABLET ORAL DAILY
Status: DISCONTINUED | OUTPATIENT
Start: 2022-07-07 | End: 2022-07-09 | Stop reason: HOSPADM

## 2022-07-07 RX ADMIN — FERROUS SULFATE TAB EC 325 MG (65 MG FE EQUIVALENT) 325 MG: 325 (65 FE) TABLET DELAYED RESPONSE at 08:38

## 2022-07-07 RX ADMIN — ROPINIROLE HYDROCHLORIDE 1 MG: 1 TABLET, FILM COATED ORAL at 08:38

## 2022-07-07 RX ADMIN — AMLODIPINE BESYLATE 5 MG: 5 TABLET ORAL at 08:38

## 2022-07-07 RX ADMIN — IPRATROPIUM BROMIDE AND ALBUTEROL SULFATE 1 AMPULE: 2.5; .5 SOLUTION RESPIRATORY (INHALATION) at 08:13

## 2022-07-07 RX ADMIN — SODIUM CHLORIDE: 9 INJECTION, SOLUTION INTRAVENOUS at 08:43

## 2022-07-07 RX ADMIN — METHYLPREDNISOLONE SODIUM SUCCINATE 40 MG: 40 INJECTION, POWDER, FOR SOLUTION INTRAMUSCULAR; INTRAVENOUS at 03:56

## 2022-07-07 RX ADMIN — METHYLPREDNISOLONE SODIUM SUCCINATE 40 MG: 40 INJECTION, POWDER, FOR SOLUTION INTRAMUSCULAR; INTRAVENOUS at 08:38

## 2022-07-07 RX ADMIN — AZITHROMYCIN MONOHYDRATE 500 MG: 500 INJECTION, POWDER, LYOPHILIZED, FOR SOLUTION INTRAVENOUS at 23:30

## 2022-07-07 RX ADMIN — GUAIFENESIN AND CODEINE PHOSPHATE 5 ML: 10; 100 LIQUID ORAL at 08:38

## 2022-07-07 RX ADMIN — SODIUM CHLORIDE, PRESERVATIVE FREE 10 ML: 5 INJECTION INTRAVENOUS at 21:31

## 2022-07-07 RX ADMIN — INSULIN LISPRO 1 UNITS: 100 INJECTION, SOLUTION INTRAVENOUS; SUBCUTANEOUS at 20:56

## 2022-07-07 RX ADMIN — FAMOTIDINE 20 MG: 20 TABLET, FILM COATED ORAL at 17:32

## 2022-07-07 RX ADMIN — PREDNISONE 40 MG: 20 TABLET ORAL at 17:32

## 2022-07-07 RX ADMIN — APIXABAN 5 MG: 5 TABLET, FILM COATED ORAL at 21:31

## 2022-07-07 RX ADMIN — CEFTRIAXONE SODIUM 1000 MG: 1 INJECTION, POWDER, FOR SOLUTION INTRAMUSCULAR; INTRAVENOUS at 21:34

## 2022-07-07 RX ADMIN — IPRATROPIUM BROMIDE AND ALBUTEROL SULFATE 1 AMPULE: 2.5; .5 SOLUTION RESPIRATORY (INHALATION) at 19:34

## 2022-07-07 RX ADMIN — SODIUM CHLORIDE, PRESERVATIVE FREE 10 ML: 5 INJECTION INTRAVENOUS at 08:38

## 2022-07-07 RX ADMIN — APIXABAN 5 MG: 5 TABLET, FILM COATED ORAL at 08:38

## 2022-07-07 RX ADMIN — LOSARTAN POTASSIUM 50 MG: 50 TABLET, FILM COATED ORAL at 08:38

## 2022-07-07 RX ADMIN — ROPINIROLE HYDROCHLORIDE 1 MG: 1 TABLET, FILM COATED ORAL at 21:31

## 2022-07-07 RX ADMIN — CITALOPRAM HYDROBROMIDE 40 MG: 20 TABLET ORAL at 08:38

## 2022-07-07 ASSESSMENT — PAIN SCALES - GENERAL: PAINLEVEL_OUTOF10: 5

## 2022-07-07 NOTE — PROGRESS NOTES
Physical Therapy  Facility/Department: [facility] PROGRESSIVE CARE  Physical Therapy Initial Assessment    Name: Mahesh Waterman  : 1950  MRN: [id number]  Date of Service: 2022    Discharge Recommendations:  Patient would benefit from continued therapy after discharge       Pt presented to ED on 22 with complaints of a cough. Patient was diagnosed with COVID recently, she presents today with complaints of continued cough, not tolerating oral intake. The patient states that she has been having nausea vomiting tolerating oral intake. Patient states that she was sent here to be evaluated by her PCP and her PCP would like her to be admitted to the hospital.    Pt had CT Chest on 22 which revealed LL pneumonia. Pt admitted for further medical management of pneumonia    RN reports patient is medically stable for therapy treatment this date. Chart reviewed prior to treatment and patient is agreeable for therapy. Patient Diagnosis(es): The primary encounter diagnosis was COVID-19. A diagnosis of Dehydration was also pertinent to this visit. Past Medical History:  has a past medical history of Cancer ([address] Hypertension, Kidney stones, Oxygen desaturation during sleep, and Pulmonary embolism ([address] Past Surgical History:  has a past surgical history that includes Cholecystectomy; Rotator cuff repair (Bilateral); Lithotripsy; Hysterectomy; Breast surgery; and joint replacement (Right, 2020). Assessment   Body Structures, Functions, Activity Limitations Requiring Skilled Therapeutic Intervention: Decreased functional mobility ; Decreased ADL status; Decreased strength;Decreased safe awareness;Decreased endurance;Decreased posture  Assessment: Pt with deficits of bed mobility, transfers, ambulation, balance, posture, safety awareness and endurance this session, & is decline compared to prior level of function.   With current deficits, Pt requires continued PT to maximize independence with functional mobility, balance, safety awareness & activity tolerance to improve overall tolerance of I ADL's. Due to recent hospitalization and medical condition, pt would benefit from additional intermittent skilled therapy at time of discharge. Please refer to the AM-PAC score for current functional status. Therapy Prognosis: Good  Decision Making: Medium Complexity  Exam: ROM, MMT, functional mobility, activity tolerance, Balance, & [address] [facility] [address] [address] AM-PAC 6 Clicks Basic Mobility  Clinical Presentation: evolving  Requires PT Follow-Up: Yes  Activity Tolerance  Activity Tolerance: Patient limited by endurance     Plan   Plan  Plan weeks: 1-2x/D, 5-6D/week  Current Treatment Recommendations: Strengthening,Functional mobility training,Transfer training,IADL training,ADL/Self-care training,Endurance training,Gait training,Home exercise program,Safety education & training,Patient/Caregiver education & training,Positioning  Safety Devices  Type of Devices: Call light within reach,Chair alarm in place,Gait belt,Heels elevated for pressure relief,Patient at risk for falls,Nurse notified (BLE's elevated on stool/pillow under and pt was edu on benefits of elevation and B ankle AROM/pumping as able to reduce swelling.   Pt with good understanding.)     Restrictions  Restrictions/Precautions  Restrictions/Precautions: General Precautions,Fall Risk  Position Activity Restriction  Other position/activity restrictions: Up as tolerated, droplet+ isol 2* COVID,telemetry, RUE IV, O2 via NC@ 3L/min, ALARMS     Subjective   General  Chart Reviewed: Yes  Patient assessed for rehabilitation services?: Yes  Additional Pertinent Hx: HTN, renal stones, PE  Response To Previous Treatment: Not applicable  General Comment  Comments: RN okayys PT  Subjective  Subjective: Pt agreeable to PT, c/o gemeralized pain rated 7/10         Social/Functional History  Social/Functional History  Lives With: Alone  Type of Home: Senior housing apartment  Home Layout: Multi-level (laundry in her unit; pt on 3rd floor)  Home Access: Elevator,Level entry  Bathroom Shower/Tub: Walk-in shower  Bathroom Toilet: Handicap height  Bathroom Equipment: Grab bars in shower,Grab bars around Dynasil chair  Home Equipment: [address] (wears O2 at night on 2L; Pt sleeps in recliner)  Receives Help From: Family,Friend(s) (pt states she has local supportive daughter & Christian friends)  ADL Assistance: Independent  Homemaking Assistance: Independent  Homemaking Responsibilities: Yes  Ambulation Assistance: Independent (uses rollator as needed)  Transfer Assistance: Independent  Active : Yes  Mode of Transportation: Car  Occupation: Retired  Type of Occupation: owned cleaning service  Leisure & Hobbies: Christian, camping  Additional Comments: Pt fell several months ago, broke her left arm when she foot got caught trying to go into car  Vision/Hearing  Vision  Vision: Impaired  Vision Exceptions: Wears glasses at all times (pt has had B cataract sx; stated her vision is getting worse & trying to get to eye Dr)  Yosef Baig  Hearing: Exceptions to [facility]  Hearing Exceptions: Hard of hearing/hearing concerns;Bilateral hearing aid (pt only has left aid here)    Cognition   Orientation  Overall Orientation Status: Impaired  Orientation Level: Oriented to place;Oriented to situation;Disoriented to place;Oriented to time  Cognition  Overall Cognitive Status: Exceptions  Arousal/Alertness: Appropriate responses to stimuli  Following Commands:  Follows all commands without difficulty  Attention Span: Attends with cues to redirect  Memory: Decreased short term memory  Safety Judgement: Decreased awareness of need for safety;Decreased awareness of need for assistance  Problem Solving: Assistance required to identify errors made;Assistance required to correct errors made;Decreased awareness of errors  Insights: Decreased awareness of deficits  Initiation: Does not require cues  Sequencing: Requires cues for some     Objective   Heart Rate: 90  Heart Rate Source: Monitor  BP: 133/73  BP Location: Right upper arm  Patient Position: Sitting  MAP (Calculated): 93  Resp: 16  SpO2: 96 %  O2 Device: Nasal cannula     Observation/Palpation  Observation: Pt on 3LO2 via NC, resting SaO2 is 93%, stayed at 93% with conversation  Edema: BLE's  Scar: Pt has previous R TKA scar        AROM RLE (degrees)  RLE AROM: WFL  AROM LLE (degrees)  LLE AROM : WFL  AROM RUE (degrees)  RUE General AROM: See OT assessment  AROM LUE (degrees)  LUE General AROM: See OT assessment  Strength RLE  Comment: 4/5 hip flexion  Strength LLE  Comment: 4/5 hip flexion  Strength RUE  Comment: See OT assessment  Strength LUE  Comment: See OT assessment  Tone RLE  RLE Tone: Normotonic  Tone LLE  LLE Tone: Normotonic  Motor Control  Gross Motor?: WFL  Sensation  Overall Sensation Status: Impaired (Pt has constant paresthesias in Ron feet 2* neuropathy)  Bed Mobility Training  Bed Mobility Training: No (Pt up in chair upon arrival and requested to stay up)  Balance  Sitting:  (SBA to CGA when pt was bending down to alejandro B socks)  Standing:  (min assist with RW)  Transfer Training  Transfer Training: Yes  Overall Level of Assistance: Minimum assistance (with RW)  Interventions: Verbal cues; Safety awareness training; Tactile cues (MOD cues/tactile assist for pacing, pursed lip breathing, scanning, RW safety/mgt, B hand placement reaching back to surface with sit to stands and controlled, squaring self/AD up to surface prior to sitting and awareness/assist with lines to increase)  Sit to Stand: Minimum assistance  Stand to Sit: Minimum assistance  Toilet Transfer:  (N/T and pt with no needs during session)  Gait  Overall Level of Assistance: Minimum assistance (with RW and bedside chair to sink, sink to around door and back to chair with RW)  Interventions: Verbal cues; Safety awareness training; Tactile cues (MOD cues for upright posture, pacing, looking up and scanning room, RW safety/mgt and staying inside AD and keeping close to body as well as awareness/assist with lines to increase overall safety/reduce falls.)  Bed mobility  Bed Mobility Comments: unable to assess, Pt up in chair at start of session, and requested to stay up in chair  Transfers  Sit to Stand: Minimal Assistance  Stand to sit: Minimal Assistance  Bed to Chair: Minimal assistance  Stand Pivot Transfers: Minimal Assistance  Lateral Transfers: Minimal Assistance  Comment: MOD VC+ tactile assist on correct use of upper body for safe sit/stand + to back all way back to surface with walker until she feels touch behind legs & to ensure she reaches with UB support to arms of chair  Ambulation  Surface: level tile  Device: Rolling Walker  Other Apparatus: O2  Assistance: Minimal assistance  Quality of Gait: step through pattern, MOD cues to keep walker close at all times, to slow danay, & upright posture for safety/scanning  Gait Deviations: Decreased step length;Decreased step height  Distance: 25ft x 2 laps     Balance  Posture: Fair  Sitting - Static: Good  Sitting - Dynamic: Good  Standing - Static: Good;- (R/W)  Standing - Dynamic: Fair;+ (R/W)  Single Leg Stance R Le (R/W)  Single Leg Stance L Le (R/W)      ([id number])  Exercises:  Circulation/Endurance Exercises: ankle pumps  Pressure Relief Exercises: WS seated x 10  Breathing Techniques: pursed lip breathing & deep breathing with incentive spirometer including technique, frequency and purpose     Pt completed lateral WS seated & completed sit to stands x 5 to promote mobility and functional pre gait activities & completed static standing weight shifts & picking feet up off floor with UB support at R/walker to improve core strength & stability   All lines intact, call light within reach, and patient positioned comfortably at end of treatment. All patient needs addressed prior to ending therapy session. OutComes Score                                                  AM-PAC Score  AM-PAC Inpatient Mobility Raw Score : 18 (07/07/22 1140)  AM-PAC Inpatient T-Scale Score : 43.63 (07/07/22 1140)  Mobility Inpatient CMS 0-100% Score: 46.58 (07/07/22 1140)  Mobility Inpatient CMS G-Code Modifier : CK (07/07/22 1140)          Goals  Short Term Goals  Time Frame for Short term goals: 12 visits  Short term goal 1: Inc bed-mobility & transfers to independent to enable pt to safely get in/OOB to decrease risk for falls; Short term goal 2: Inc gait to amb 350ft or > indep w/ RW to enable pt to return to previous level of independence & able to demonstrate indep/ safe use of RW in functional activities including approaching surfaces and turning to sit. Short term goal 3: Inc strength to [address] standing balance to good with device to facilitate pt independence for performance of ADL's & functional mobility, & reduce fall risk  Short term goal 4: Pt able to tolerate 30-40 min of activity to include 15-20 reps of ex, NMR & functional mobility with device to facilitate activity tolerance to [facility]  Short term goal 5: Ed pt on home ex's, safety, balance & endurance training, fall prevention, & issue written Pt Ed       Education  Patient Education  Education Given To: Patient  Education Provided: Role of Therapy;Plan of Care;Precautions;Transfer Training;Energy Conservation; Fall Prevention Strategies  Education Provided Comments: Ed pt on functional mobility, safety awareness, importance of being up & OOB to regain strength, & prevention of sedentary complications, circulation ex's,  pressure relief & optimal breathing techniques  Education Method: Demonstration;Verbal  Education Outcome: Verbalized understanding;Continued education needed      Therapy Time   Individual Concurrent Group Co-treatment   Time In 1045/1245         Time Out 1140/1255         Minutes 55+10=65              Treatment time: 60 minutes  Additional 10 minutes for chart review            [address], PT

## 2022-07-07 NOTE — PLAN OF CARE
Problem: Discharge Planning  Goal: Discharge to home or other facility with appropriate resources  Outcome: Progressing  Flowsheets (Taken 7/6/2022 2300)  Discharge to home or other facility with appropriate resources: Identify barriers to discharge with patient and caregiver     Problem: Pain  Goal: Verbalizes/displays adequate comfort level or baseline comfort level  Outcome: Progressing     Problem: Safety - Adult  Goal: Free from fall injury  Outcome: Progressing     Problem: ABCDS Injury Assessment  Goal: Absence of physical injury  Outcome: Progressing

## 2022-07-07 NOTE — RT PROTOCOL NOTE
RT Nebulizer Bronchodilator Protocol Note    There is a bronchodilator order in the chart from a provider indicating to follow the RT Bronchodilator Protocol and there is an Initiate RT Bronchodilator Protocol order as well (see protocol at bottom of note). CXR Findings:  XR CHEST PORTABLE    Result Date: 7/6/2022  Unremarkable portable chest radiograph. The findings from the last RT Protocol Assessment were as follows:  Smoking: None or smoker <15 pack years  Respiratory Pattern: Mild dyspnea at rest, irregular pattern, or RR 21-25 bpm  Breath Sounds: Slightly diminished and/or crackles  Cough: Weak, productive  Indication for Bronchodilator Therapy:    Bronchodilator Assessment Score: 8    Aerosolized bronchodilator medication orders have been revised according to the RT Nebulizer Bronchodilator Protocol below. Respiratory Therapist to perform RT Therapy Protocol Assessment initially then follow the protocol. Repeat RT Therapy Protocol Assessment PRN for score 0-3 or on second treatment, BID, and PRN for scores above 3. No Indications - adjust the frequency to every 6 hours PRN wheezing or bronchospasm, if no treatments needed after 48 hours then discontinue using Per Protocol order mode. If indication present, adjust the RT bronchodilator orders based on the Bronchodilator Assessment Score as indicated below. If a patient is on this medication at home then do not decrease Frequency below that used at home. 0-3 - enter or revise RT bronchodilator order(s) to equivalent RT Bronchodilator order with Frequency of every 4 hours PRN for wheezing or increased work of breathing using Per Protocol order mode. 4-6 - enter or revise RT Bronchodilator order(s) to two equivalent RT bronchodilator orders with one order with BID Frequency and one order with Frequency of every 4 hours PRN wheezing or increased work of breathing using Per Protocol order mode.          7-10 - enter or revise RT Bronchodilator order(s) to two equivalent RT bronchodilator orders with one order with TID Frequency and one order with Frequency of every 4 hours PRN wheezing or increased work of breathing using Per Protocol order mode. 11-13 - enter or revise RT Bronchodilator order(s) to one equivalent RT bronchodilator order with QID Frequency and an Albuterol order with Frequency of every 4 hours PRN wheezing or increased work of breathing using Per Protocol order mode. Greater than 13 - enter or revise RT Bronchodilator order(s) to one equivalent RT bronchodilator order with every 4 hours Frequency and an Albuterol order with Frequency of every 2 hours PRN wheezing or increased work of breathing using Per Protocol order mode. RT to enter RT Home Evaluation for COPD & MDI Assessment order using Per Protocol order mode.     Electronically signed by Steve Pack RCP on 7/6/2022 at 10:03 PM

## 2022-07-07 NOTE — RT PROTOCOL NOTE
RT Inhaler-Nebulizer Bronchodilator Protocol Note    There is a bronchodilator order in the chart from a provider indicating to follow the RT Bronchodilator Protocol and there is an Initiate RT Inhaler-Nebulizer Bronchodilator Protocol order as well (see protocol at bottom of note). CXR Findings:  XR CHEST PORTABLE    Result Date: 7/6/2022  Unremarkable portable chest radiograph. The findings from the last RT Protocol Assessment were as follows:   History Pulmonary Disease: None or smoker <15 pack years  Respiratory Pattern: Mild dyspnea at rest, irregular pattern, or RR 21-25 bpm  Breath Sounds: Slightly diminished and/or crackles  Cough: Strong, spontaneous, non-productive  Indication for Bronchodilator Therapy: Decreased or absent breath sounds  Bronchodilator Assessment Score: 6    Aerosolized bronchodilator medication orders have been revised according to the RT Inhaler-Nebulizer Bronchodilator Protocol below. Respiratory Therapist to perform RT Therapy Protocol Assessment initially then follow the protocol. Repeat RT Therapy Protocol Assessment PRN for score 0-3 or on second treatment, BID, and PRN for scores above 3. No Indications - adjust the frequency to every 6 hours PRN wheezing or bronchospasm, if no treatments needed after 48 hours then discontinue using Per Protocol order mode. If indication present, adjust the RT bronchodilator orders based on the Bronchodilator Assessment Score as indicated below. Use Inhaler orders unless patient has one or more of the following: on home nebulizer, not able to hold breath for 10 seconds, is not alert and oriented, cannot activate and use MDI correctly, or respiratory rate 25 breaths per minute or more, then use the equivalent nebulizer order(s) with same Frequency and PRN reasons based on the score. If a patient is on this medication at home then do not decrease Frequency below that used at home.     0-3 - enter or revise RT bronchodilator order(s) to equivalent RT Bronchodilator order with Frequency of every 4 hours PRN for wheezing or increased work of breathing using Per Protocol order mode. 4-6 - enter or revise RT Bronchodilator order(s) to two equivalent RT bronchodilator orders with one order with BID Frequency and one order with Frequency of every 4 hours PRN wheezing or increased work of breathing using Per Protocol order mode. 7-10 - enter or revise RT Bronchodilator order(s) to two equivalent RT bronchodilator orders with one order with TID Frequency and one order with Frequency of every 4 hours PRN wheezing or increased work of breathing using Per Protocol order mode. 11-13 - enter or revise RT Bronchodilator order(s) to one equivalent RT bronchodilator order with QID Frequency and an Albuterol order with Frequency of every 4 hours PRN wheezing or increased work of breathing using Per Protocol order mode. Greater than 13 - enter or revise RT Bronchodilator order(s) to one equivalent RT bronchodilator order with every 4 hours Frequency and an Albuterol order with Frequency of every 2 hours PRN wheezing or increased work of breathing using Per Protocol order mode. RT to enter RT Home Evaluation for COPD & MDI Assessment order using Per Protocol order mode.     Electronically signed by Fatmata Peck RCP on 7/7/2022 at 8:18 AM

## 2022-07-07 NOTE — CARE COORDINATION
Case Management Initial Discharge Plan  Dinah Breen,         Readmission Risk              Risk of Unplanned Readmission:  19             Met with:patient to discuss discharge plans. Information verified: address, contacts, phone number, , insurance Yes  PCP: Manda Peacock MD  Date of last visit: Last week     Insurance Provider: [facility] Medicaid     Discharge Planning  Current Residence:  [address] senior apt alone   Living Arrangements:  BEBA [place] has 1 stories/0 stairs to climb has elevator to 3rd floor   Support Systems:  Children  Current Services PTA:  Na  Agency: na   Patient able to perform ADL's:Independent  DME in home:  02 2l@ hs, walker, pox, shower chair   DME used to aid ambulation prior to admission:   Walker prn   DME used during admission:  Walker     Potential Assistance Needed:  N/A    Pharmacy: [facility]    Potential Assistance Purchasing Medications:  No  Does patient want to participate in local refill/ meds to beds program?  Yes    Patient agreeable to home care: Yes  Freedom of choice provided:  yes      Type of Home Care Services:  None  Patient expects to be discharged to:       Prior SNF/Rehab Placement and Facility: [facility]   Agreeable to SNF/Rehab: No-only if absolutely needed  Freedom of choice provided: n/a   Evaluation: n/a    Expected Discharge date: Follow Up Appointment: Best Day/ Time: Monday AM    Transportation provider: friend may need help with ride home   Transportation arrangements needed for discharge: possibly     Discharge Plan: 2l. Covid+  Pt from sr apt alone. DME- 02 2l@ hs, walker, pox, shower chair. Ref to [id number] [facility]. May need help with a ride home. Eliquis home med    Pt lives in a senior apt from home alone, independent and able to drive pta. Hx left breast cancer-she relays she was being worked up for Incentive cancer\". Her WBC was 0.9 on admit. She does have home 02 2l @ hs-but does not remember the provider.  She does have portable tanks. She was going to OP pt at [address] Discussed hhc given her weakness/covid+-she agrees had [facility] in past and agrees to referral.    Referral faxed to [facility]. Her dtr lives about 15 minutes from her however her dtr just went on vacation to [place]. She may need assistance in finding a ride home. Pt tested covid+ 6/30.     Covid/Dehydration  Zmax IV  Rocephin IV  Steroids IV  IVF       Electronically signed by Caterina Diana RN on 7/7/22 at 9:52 AM EDT

## 2022-07-07 NOTE — ED NOTES
ED to inpatient nurses report     Chief Complaint   Patient presents with    Shortness of Breath     covid positive, pneumonia positive      Present to ED from home via EMS with SOB and productive cough, pt was tested for covid last Tuesday and dx with pneumonia   LOC: alert and orientated to name, place, date  Vital signs   Vitals:    07/06/22 1535 07/06/22 1545 07/06/22 1600   BP:  (!) 158/82 (!) 157/85   Pulse:  96 95   Resp:  22 19   Temp: 98.6 °F (37 °C)     SpO2:  98% 96%      Oxygen Baseline room air use oxygen at night at 2L    Current needs required 2L at all times   LDAs:   Peripheral IV 07/06/22 Right Forearm (Active)   Site Assessment Clean, dry & intact 07/06/22 1536   Line Status Blood return noted; Flushed;Normal saline locked;Specimen collected 07/06/22 1536     Mobility: Independent  Fall Risk: [place] 1 Fall Risk  Presents to emergency department  because of falls (Syncope, seizure, or loss of consciousness): No (07/06/22 1534)  Age > 79: Yes (07/06/22 1534)  Altered Mental Status, Intoxication with alcohol or substance confusion (Disorientation, impaired judgment, poor safety awaremess, or inability to follow instructions): No (07/06/22 1534)  Impaired Mobility: Ambulates or transfers with assistive devices or assistance;  Unable to ambulate or transer.: Yes (07/06/22 1534)  Nursing Judgement: Yes (07/06/22 1534)  Pending ED orders: none  Present condition: stable  Code Status: full  Consults: IP CONSULT TO HOSPITALIST  [x]  Hospitalist  Completed  [] yes [] no Who:   []  Medicine  Completed  [] yes [] No Who:   []  Cardiology  Completed  [] yes [] No Who:   []  GI   Completed  [] yes [] No Who:   []  Neurology  Completed  [] yes [] No Who:   []  Nephrology Completed  [] yes [] No Who:    []  Vascular  Completed  [] yes [] No Who:   []  Ortho  Completed  [] yes [] No Who:     []  Surgery  Completed  [] yes [] No Who:    []  Urology  Completed  [] yes [] No Who:    []  CT Surgery Completed  [] yes [] No Who:   []  Podiatry  Completed  [] yes [] No Who:    []  Other    Completed  [] yes [] No Who:  Interventions: IV, lab work, xray,   Important Events: Pt 92% on room air, placed on nasal cannula up to 96%.          Electronically signed by Shayy Rodarte RN on 7/6/2022 at 8:11 PM       Shayy Rodarte RN  07/06/22 2016

## 2022-07-07 NOTE — DISCHARGE INSTR - COC
Continuity of Care Form    Patient Name: Luciana Arango   :  1950  MRN:  [id number]    Admit date:  2022  Discharge date:  2022    Code Status Order: Full Code   Advance Directives:      Admitting Physician:  Ta Fitzpatrick DO  PCP: Zeynep Lan MD    Discharging Nurse: [facility] Unit/Room#: 9595/6674-97  Discharging Unit Phone Number: [phone]    Emergency Contact:   Extended Emergency Contact Information  Primary Emergency Contact: Brennan Garcia Phone: [phone]  Mobile Phone: [phone]  Relation: Child  Preferred language: English   needed?  Yes    Past Surgical History:  Past Surgical History:   Procedure Laterality Date    BREAST SURGERY      CHOLECYSTECTOMY      HYSTERECTOMY (CERVIX STATUS UNKNOWN)      JOINT REPLACEMENT Right 2020    LITHOTRIPSY      ROTATOR CUFF REPAIR Bilateral        Immunization History:   Immunization History   Administered Date(s) Administered    COVID-19, PFIZER PURPLE top, DILUTE for use, (age 15 y+), 30mcg/0.3mL 2021, 2021, 2021       Active Problems:  Patient Active Problem List   Diagnosis Code    Encounter for screening for other suspected endocrine disorder Z13.29    Carcinoma of upper-outer quadrant of left breast in female, estrogen receptor [facility] C50.412, Z17.0    Pneumonia J18.9       Isolation/Infection:   Isolation            Droplet Plus          Patient Infection Status       Infection Onset Added Last Indicated Last Indicated By Review Planned Expiration Resolved Resolved By    COVID-19 22 COVID-19 22              Nurse Assessment:  Last Vital Signs: /73   Pulse 90   Temp 97.7 °F (36.5 °C) (Oral)   Resp 16   Ht 5' 5\" (1.651 m)   Wt 202 lb 9.6 oz (91.9 kg)   SpO2 96%   BMI 33.71 kg/m²     Last documented pain score (0-10 scale): Pain Level: 6  Last Weight:   Wt Readings from Last 1 Encounters:   22 202 lb 9.6 oz (91.9 kg)     Mental Status:  oriented    IV Access:  - None    Nursing Mobility/ADLs:  Walking   Independent  Transfer  Independent  Bathing  Independent  Dressing  Independent  Toileting  Independent  Feeding  Independent  Med Admin  Independent  Med Delivery   whole    Wound Care Documentation and Therapy:        Elimination:  Continence: Bowel: No  Bladder: No  Urinary Catheter: None   Colostomy/Ileostomy/Ileal Conduit: No       Date of Last BM: 7/8/2022  No intake or output data in the 24 hours ending 07/07/22 1336  No intake/output data recorded. Safety Concerns: At Risk for Falls    Impairments/Disabilities:      Vision    Nutrition Therapy:  Current Nutrition Therapy:   - Oral Diet:  General    Routes of Feeding: Oral  Liquids: No Restrictions  Daily Fluid Restriction: no  Last Modified Barium Swallow with Video (Video Swallowing Test): not done    Treatments at the Time of Hospital Discharge:   Respiratory Treatments: See [facility]   Oxygen Therapy:  is on oxygen at 2 L/min per nasal cannula. Ventilator:    - No ventilator support    Rehab Therapies: Physical Therapy and Occupational Therapy  Weight Bearing Status/Restrictions: No weight bearing restrictions  Other Medical Equipment (for information only, NOT a DME order):  walker  Other Treatments:   SN   Medication teaching     Patient's personal belongings (please select all that are sent with patient):  None    RN SIGNATURE:  Electronically signed by Rhonda Wallace RN on 7/8/22 at 2:41 PM EDT    CASE MANAGEMENT/SOCIAL WORK SECTION    Inpatient Status Date: ***    Readmission Risk Assessment Score:  Readmission Risk              Risk of Unplanned Readmission:  19           Discharging to Facility/ Agency   Name: [facility]  Address: [address] [place] [address]. -[place] [address]  Phone: [phone]  Fax:[phone]      / signature: Electronically signed by Neo Dillard RN on 7/7/22 at 1:37 PM EDT    PHYSICIAN SECTION    Prognosis: Fair    Condition at Discharge: Stable    Rehab Potential (if transferring to Rehab): Fair    Recommended Labs or Other Treatments After Discharge: ***    Physician Certification: I certify the above information and transfer of Elsy Weeks  is necessary for the continuing treatment of the diagnosis listed and that she requires [address] for less 30 days.      Update Admission H&P: No change in H&P    PHYSICIAN SIGNATURE:  Electronically signed by Raheel Feng DO on 7/8/22 at 2:42 PM EDT

## 2022-07-07 NOTE — ACP (ADVANCE CARE PLANNING)
Advance Care Planning     Advance Care Planning Activator (Inpatient)  Conversation Note      Date of ACP Conversation: 7/7/2022     Conversation Conducted with: Patient with Decision Making Capacity    ACP Activator: Ginette Giraldo RN        Health Care Decision Maker: self     Current Designated Health Care Decision Maker: self     Click here to complete Healthcare Decision Makers including section of the Healthcare Decision Maker Relationship (ie \"Primary\")  Today we documented Decision Maker(s). The patient will provide ACP documents. Care Preferences    Ventilation: \"If you were in your present state of health and suddenly became very ill and were unable to breathe on your own, what would your preference be about the use of a ventilator (breathing machine) if it were available to you? \"      Would the patient desire the use of ventilator (breathing machine)?: yes    \"If your health worsens and it becomes clear that your chance of recovery is unlikely, what would your preference be about the use of a ventilator (breathing machine) if it were available to you? \"     Would the patient desire the use of ventilator (breathing machine)?: No      Resuscitation  \"CPR works best to restart the heart when there is a sudden event, like a heart attack, in someone who is otherwise healthy. Unfortunately, CPR does not typically restart the heart for people who have serious health conditions or who are very sick. \"    \"In the event your heart stopped as a result of an underlying serious health condition, would you want attempts to be made to restart your heart (answer \"yes\" for attempt to resuscitate) or would you prefer a natural death (answer \"no\" for do not attempt to resuscitate)? \" no       [] Yes   [] No   Educated Patient / Juan Faulkner regarding differences between Advance Directives and portable DNR orders.     Length of ACP Conversation in minutes:  15    Conversation Outcomes:  [x] ACP discussion completed  [] Existing advance directive reviewed with patient; no changes to patient's previously recorded wishes  [] New Advance Directive completed  [] Portable Do Not Rescitate prepared for Provider review and signature  [] POLST/POST/MOLST/MOST prepared for Provider review and signature      Follow-up plan:    [] Schedule follow-up conversation to continue planning  [x] Referred individual to Provider for additional questions/concerns   [] Advised patient/agent/surrogate to review completed ACP document and update if needed with changes in condition, patient preferences or care setting    [] This note routed to one or more involved healthcare providers      Pt is a+o, , has Living Will and HCPOA dtr Ever Fu is HCPOA-requested paperwork however her dtr is out of town and cannot bring a copy.

## 2022-07-07 NOTE — CARE COORDINATION
DC Planning    Spoke with Claudia Briceno from [facility]-pt has been accepted. Jose Pulido NEEDS SIGNED. H+P/VAMSI/AVS/DC SUMMARY WILL NEED FAXED TO [address] AT [place] [phone].

## 2022-07-07 NOTE — H&P
[facility]  Office: [address], DO, Bradley Hayward, DO, Qiana Alanis, DO, Gene Felicitas Blood, DO, Ping Grullon MD, Marsha Farley MD, Joey Coates MD, Nelly Dao MD, Noris Wells MD, Ivanna Galvin MD, Nupur Valencia MD, Samantha Dc, DO, Cecy Ascencio MD,  Yonny Silva DO, Edgardo Anand MD, Jesse Jarrett MD, Patsy Russell DO, Marissa Lu MD, Emy Ratliff MD, Rahat Boss DO, Dunia Peck MD, Tisha Barry MD, Birtha Members, [facility], [facility], CNP, Kim Kenyon, CNP, Erick Torres, CNP, Carlitos Méndez, CNP, Lukasz Murguia, CNP, Jesse Weinstein PA-C, Tony Garnica, [facility], Irina Snyder, CNP, Gianna Woodard, CNP, Edward Felix, CNP, María Godinez, CNS, Rosa Del Rio, [facility], Tova Solis, CNP, Alejandra Roche, CNP, Martha Llanes, [facility]    HISTORY AND PHYSICAL EXAMINATION            Date:   7/7/2022  Patient name:  Hilton Alvarado  Date of admission:  7/6/2022  3:34 PM  MRN:   [id number]  Account:  [de-identified]  YOB: 1950  PCP:    Jason Jarvis MD  Room:   2569/7647-15  Code Status:    Full Code    Chief Complaint:     Chief Complaint   Patient presents with    Shortness of Breath     covid positive, pneumonia positive       History Obtained From:     patient    History of Present Illness:     Hilton Alvarado is a 66 y.o. Non- / non  female who presents with Shortness of Breath (covid positive, pneumonia positive)   and is admitted to the hospital for the management of COVID-19 virus infection. This is a 43-year-old female who presents with a complaint of shortness of breath with continued cough after recent diagnosis of COVID-19. She is fully vaccinated and has had 3 or 4 booster shots due to her cancer and ongoing treatment. She was diagnosed with COVID-19 on June 28 and has been treated with Paxlovid as outpatient.   She has improvement has continued shortness of breath and cough and higher oxygen requirements. She has chronic home oxygen therapy with nocturnal use only at 2 L nasal cannula. She has been more fatigued and sleeping more during the day and using her oxygen throughout the day as well. Other than the antiviral agent she has not had any other treatment for COVID. Shortly after her initial diagnosis she had an evaluation for worsening shortness of breath on July 4 in the emergency [address] [facility] ER. She underwent routine investigation with CT scan of the chest and x-rays at that time and was discharged home. She was advised by her PCP to come to the emergency room due to ongoing symptoms. Past Medical History:     Past Medical History:   Diagnosis Date    Cancer ([facility] [address]     Hypertension     Kidney stones     Oxygen desaturation during sleep     Pulmonary embolism (HCC)     orginated in knee traveled to lung        Past Surgical History:     Past Surgical History:   Procedure Laterality Date    BREAST SURGERY      CHOLECYSTECTOMY      HYSTERECTOMY (CERVIX STATUS UNKNOWN)      JOINT REPLACEMENT Right 01/13/2020    LITHOTRIPSY      ROTATOR CUFF REPAIR Bilateral         Medications Prior to Admission:     Prior to Admission medications    Medication Sig Start Date End Date Taking?  Authorizing Provider   azithromycin (ZITHROMAX) 500 MG tablet Take 1 tablet by mouth daily for 5 days 7/4/22 7/9/22  Elissa Glynn DO   benzonatate (TESSALON) 100 MG capsule Take 1 capsule by mouth 3 times daily as needed for Cough 7/4/22 7/11/22  Elissa Glynn DO   amLODIPine (NORVASC) 5 MG tablet Take 5 mg by mouth daily    Historical Provider, MD   citalopram (CELEXA) 40 MG tablet Take 40 mg by mouth daily    Historical Provider, MD   apixaban (ELIQUIS) 5 MG TABS tablet Take 5 mg by mouth 2 times daily    Historical Provider, MD   ferrous sulfate (IRON 325) 325 (65 Fe) MG tablet Take 325 mg by mouth daily (with breakfast)    Historical Provider, MD   losartan (COZAAR) 50 MG tablet Take 50 mg by mouth daily    Historical Provider, MD   omeprazole (PRILOSEC) 20 MG delayed release capsule Take 20 mg by mouth as needed    Historical Provider, MD   rOPINIRole (REQUIP) 1 MG tablet Take 1 mg by mouth 2 times daily    Historical Provider, MD   diazePAM (VALIUM) 5 MG tablet Take 5 mg by mouth as needed for Anxiety. Indications: 60 mins prior to injection therapy    Historical Provider, MD   gabapentin (NEURONTIN) 300 MG capsule Take 300 mg by mouth as needed. Historical Provider, MD   Cholecalciferol (VITAMIN D3) 1.25 MG (38425 UT) CAPS Take by mouth as needed    Historical Provider, MD        Allergies:     Crestor [rosuvastatin], Lisinopril, and Pcn [penicillins]    Social History:     Tobacco:    reports that she has quit smoking. She has never used smokeless tobacco.  Alcohol:      reports previous alcohol use. Drug Use:  reports previous drug use. Family History:     Family History   Problem Relation Age of Onset    Cancer Sister     Cancer Brother     Cancer Brother     Cancer Brother     Cancer Sister     Cancer Niece     Cancer Nephew     Cancer Niece     Cancer Nephew     Cancer Nephew     Cancer Nephew        Review of Systems:     Positive and Negative as described in HPI.     CONSTITUTIONAL:  negative for fevers, chills, sweats, weight loss, positive for fatigue  HEENT:  negative for vision, hearing changes, runny nose, throat pain  RESPIRATORY: Positive for shortness of breath, cough, congestion, negative for wheezing  CARDIOVASCULAR:  negative for chest pain, palpitations  GASTROINTESTINAL:  negative for nausea, vomiting, diarrhea, constipation, change in bowel habits, abdominal pain   GENITOURINARY:  negative for difficulty of urination, burning with urination, frequency   INTEGUMENT:  negative for rash, skin lesions, easy bruising   HEMATOLOGIC/LYMPHATIC:  negative for swelling/edema   ALLERGIC/IMMUNOLOGIC:  negative for urticaria , itching  ENDOCRINE:  negative increase in drinking, increase in urination, hot or cold intolerance  MUSCULOSKELETAL:  negative joint pains, muscle aches, swelling of joints  NEUROLOGICAL:  negative for headaches, dizziness, lightheadedness, numbness, pain, tingling extremities  BEHAVIOR/PSYCH:  negative for depression, anxiety    Physical Exam:   /73   Pulse 90   Temp 97.7 °F (36.5 °C) (Oral)   Resp 16   Ht 5' 5\" (1.651 m)   Wt 202 lb 9.6 oz (91.9 kg)   SpO2 96%   BMI 33.71 kg/m²   Temp (24hrs), Av.9 °F (36.6 °C), Min:97.2 °F (36.2 °C), Max:98.6 °F (37 °C)    No results for input(s): POCGLU in the last 72 hours. No intake or output data in the 24 hours ending 22 1442    General Appearance:  alert, well appearing, and in no acute distress  Mental status: oriented to person, place, and time  Head:  normocephalic, atraumatic  Eye: no icterus, redness, pupils equal and reactive, extraocular eye movements intact, conjunctiva clear  Ear: normal external ear, no discharge, hearing intact  Nose:  no drainage noted  Mouth: mucous membranes moist  Neck: supple, no carotid bruits, thyroid not palpable  Lungs: Bilateral equal air entry, clear to auscultation, no wheezing, rales or rhonchi, normal effort, breath sounds diminished, nasal cannula oxygen use at 3 L  Cardiovascular: normal rate, regular rhythm, no murmur, gallop, rub.   Abdomen: Soft, nontender, nondistended, normal bowel sounds, no hepatomegaly or splenomegaly  Neurologic: There are no new focal motor or sensory deficits, normal muscle tone and bulk, no abnormal sensation, normal speech, cranial nerves II through XII grossly intact  Skin: No gross lesions, rashes, bruising or bleeding on exposed skin area  Extremities:  peripheral pulses palpable, no pedal edema or calf pain with palpation  Psych: normal affect     Investigations:      Laboratory Testing:  Recent Results (from the past 24 hour(s))   EKG 12 Lead    Collection Time: 22 3:32 PM   Result Value Ref Range    Ventricular Rate 96 BPM    Atrial Rate 96 BPM    P-R Interval 132 ms    QRS Duration 110 ms    Q-T Interval 384 ms    QTc Calculation (Bazett) 485 ms    P Axis 38 degrees    R Axis 14 degrees    T Axis 41 degrees   Basic Metabolic Panel    Collection Time: 07/06/22  4:06 PM   Result Value Ref Range    Glucose 116 (H) 70 - 99 mg/dL    BUN 17 8 - 23 mg/dL    CREATININE 0.72 0.50 - 0.90 mg/dL    Bun/Cre Ratio 24 (H) 9 - 20    Calcium 9.1 8.6 - 10.4 mg/dL    Sodium 136 135 - 144 mmol/L    Potassium 3.9 3.7 - 5.3 mmol/L    Chloride 98 98 - 107 mmol/L    CO2 28 20 - 31 mmol/L    Anion Gap 10 9 - 17 mmol/L    GFR Non-African American >60 >60 mL/min    GFR African American >60 >60 mL/min    GFR Comment         CBC with Auto Differential    Collection Time: 07/06/22  4:06 PM   Result Value Ref Range    WBC 1.0 (LL) 3.5 - 11.3 k/uL    RBC 4.78 3.95 - 5.11 m/uL    Hemoglobin 13.8 11.9 - 15.1 g/dL    Hematocrit 43.8 36.3 - 47.1 %    MCV 91.6 82.6 - 102.9 fL    MCH 28.9 25.2 - 33.5 pg    MCHC 31.5 28.4 - 34.8 g/dL    RDW 13.0 11.8 - 14.4 %    Platelets 967 575 - 587 k/uL    MPV 9.7 8.1 - 13.5 fL    NRBC Automated 0.0 0.0 per 100 WBC    Seg Neutrophils 8 (L) 36 - 66 %    Lymphocytes 77 (H) 24 - 44 %    Atypical Lymphocytes 5 %    Monocytes 7 1 - 7 %    Eosinophils % 0 (L) 1 - 4 %    Basophils 0 %    Immature Granulocytes 3 (H) 0 %    Segs Absolute 0.08 (L) 1.8 - 7.7 k/uL    Absolute Lymph # 0.77 (L) 1.0 - 4.8 k/uL    Atypical Lymphocytes Absolute 0.05 k/uL    Absolute Mono # 0.07 (L) 0.2 - 0.8 k/uL    Absolute Eos # 0.00 0.0 - 0.4 k/uL    Basophils Absolute 0.00 0.0 - 0.2 k/uL    Absolute Immature Granulocyte 0.03 0.00 - 0.30 k/uL   Lactate, Sepsis    Collection Time: 07/06/22  4:06 PM   Result Value Ref Range    Lactic Acid, Sepsis <0.2 (L) 0.5 - 1.9 mmol/L   Procalcitonin    Collection Time: 07/06/22  4:06 PM   Result Value Ref Range    Procalcitonin 0.09 (H) <0.09 ng/mL   Lactate, Sepsis Collection Time: 07/06/22  5:50 PM   Result Value Ref Range    Lactic Acid, Sepsis 1.0 0.5 - 1.9 mmol/L   Basic Metabolic Panel w/ Reflex to MG    Collection Time: 07/07/22  5:23 AM   Result Value Ref Range    Glucose 252 (H) 70 - 99 mg/dL    BUN 17 8 - 23 mg/dL    CREATININE 0.76 0.50 - 0.90 mg/dL    Bun/Cre Ratio 22 (H) 9 - 20    Calcium 8.7 8.6 - 10.4 mg/dL    Sodium 137 135 - 144 mmol/L    Potassium 4.1 3.7 - 5.3 mmol/L    Chloride 101 98 - 107 mmol/L    CO2 28 20 - 31 mmol/L    Anion Gap 8 (L) 9 - 17 mmol/L    GFR Non-African American >60 >60 mL/min    GFR African American >60 >60 mL/min    GFR Comment         C-Reactive Protein    Collection Time: 07/07/22  5:23 AM   Result Value Ref Range    CRP 24.4 (H) 0.0 - 5.0 mg/L       Imaging/Diagnostics:  XR CHEST PORTABLE    Result Date: 7/6/2022  Unremarkable portable chest radiograph. CT CHEST PULMONARY EMBOLISM W CONTRAST    Result Date: 7/4/2022  No evidence of pulmonary embolism. Mild left lower lobe pneumonia. Assessment :      Hospital Problems           Last Modified POA    * (Principal) COVID-19 virus infection 7/7/2022 Yes    Chemotherapy-induced neutropenia ([address] 7/7/2022 Yes    Primary hypertension 7/7/2022 Yes    Hypoxia 7/7/2022 Yes    Hyperglycemia 7/7/2022 Yes    Carcinoma of upper-outer quadrant of left breast in female, estrogen receptor positive ([address] 7/7/2022 Yes          Plan:     Patient status inpatient in the Progressive Unit/Step down    Inpatient admission  Oxygen as ordered  Aerosol protocol  Monitoring control blood pressure  Continue home medications  GI and DVT prophylaxis  Activity as tolerated, PT and OT as needed  Insulin scale for steroid-induced hyperglycemia  Monitor for 24 hours on IV antibiotics, patient has been afebrile and has negative procalcitonin, likely no bacterial infection.   Oncology evaluation for consideration for Neupogen for neutropenia  Home oxygen evaluation prior to discharge, likely discharge July 8 if remains stable    Consultations:   IP CONSULT TO HOSPITALIST     Patient is admitted as inpatient status because of co-morbidities listed above, severity of signs and symptoms as outlined, requirement for current medical therapies and most importantly because of direct risk to patient if care not provided in a hospital setting. Expected length of stay > 48 hours.     Marianne Orozco DO  7/7/2022  2:42 PM    Copy sent to Dr. Manda Peacock MD

## 2022-07-07 NOTE — PROGRESS NOTES
Occupational Therapy  Facility/Department: [facility] PROGRESSIVE CARE  Occupational Therapy Initial Assessment    Name: Michael Arana  : 1950  MRN: [id number]  Date of Service: 2022    RN Sada Magallanes reports patient is medically stable for therapy treatment this date. Chart reviewed prior to treatment and patient is agreeable for therapy. All lines intact and patient positioned comfortably at end of treatment. All patient needs addressed prior to ending therapy session. Due to recent hospitalization and medical condition, pt would benefit from additional intermittent skilled therapy at time of discharge. Please refer to the AM-PAC score for current functional status. Discharge Recommendations:  Patient would benefit from continued therapy after discharge  OT Equipment Recommendations  Equipment Needed: Yes  Mobility Devices: Chaitanya Janus; ADL Assistive Devices  Walker: Rolling  ADL Assistive Devices: Reacher;Sock-Aid Soft;Long-handled Shoe Horn;Long-handled Sponge       Patient Diagnosis(es): The primary encounter diagnosis was COVID-19. A diagnosis of Dehydration was also pertinent to this visit. Past Medical History:  has a past medical history of Cancer ([address] Hypertension, Kidney stones, Oxygen desaturation during sleep, and Pulmonary embolism ([facility] [address] Past Surgical History:  has a past surgical history that includes Cholecystectomy; Rotator cuff repair (Bilateral); Lithotripsy; Hysterectomy; Breast surgery; and joint replacement (Right, 2020). Assessment   Performance deficits / Impairments: Decreased functional mobility ; Decreased safe awareness;Decreased balance;Decreased ADL status; Decreased endurance;Decreased high-level IADLs;Decreased vision/visual deficit; Decreased posture  Assessment: Skilled OT is indicated for teach and train of AE use to increase I/ease and for EC/WS tech with functional tasks as well as improving balance and overall act diana.   Prognosis: Good  Decision Making: Medium Complexity  REQUIRES OT FOLLOW-UP: Yes  Activity Tolerance  Activity Tolerance: Patient limited by fatigue;Patient limited by pain  Activity Tolerance Comments: fair minus and stand diana approx 4-5 mins with RW        Plan   Plan  Times per Week: 4-5x/week 1x/day as diana  Current Treatment Recommendations: Strengthening,Functional mobility training,Endurance training,Pain management,Neuromuscular re-education,Safety education & training,Patient/Caregiver education & training,Equipment evaluation, education, & procurement,Self-Care / ADL,Home management training,Cognitive/Perceptual training,Positioning     Restrictions  Restrictions/Precautions  Restrictions/Precautions: General Precautions,Fall Risk  Position Activity Restriction  Other position/activity restrictions: Up as tolerated, droplet+ isol 2* COVID,telemetry, RUE IV, O2 via NC@ 3L/min, ALARMS    Subjective   General  Chart Reviewed: Yes  Patient assessed for rehabilitation services?: Yes  Family / Caregiver Present: No  Subjective  Subjective: Pt states she has generalized pain and rated 7/10 and only takes tylenol.      Social/Functional History  Social/Functional History  Lives With: Alone  Type of Home: Senior housing apartment  Home Layout: Multi-level (laundry in her unit; pt on 3rd floor)  Home Access: Elevator,Level entry  [place] Shower/Tub: Walk-in shower  Bathroom Toilet: Handicap height  Bathroom Equipment: Grab bars in shower,Grab bars around [employer] chair  Home Equipment: [address] (wears O2 at night on 2L; Pt sleeps in recliner)  Receives Help From: Family,Friend(s) (pt states she has local supportive daughter & Muslim friends)  ADL Assistance: Independent  Homemaking Assistance: Independent  Homemaking Responsibilities: Yes  Ambulation Assistance: Independent (uses rollator as needed)  Transfer Assistance: Independent  Active : Yes  Mode of Transportation: Car  Occupation: Retired  Type of Occupation: owned cleaning service  Leisure & Hobbies: Latter day, camping  Additional Comments: Pt fell several months ago, broke her left arm when she foot got caught trying to go into car       Objective   *pt wears glasses all the time; reports she has had previous cataract sx and eyesight has been getting worse  *pt is [facility] and wears B hearing aids however only has L aid here at hospital           Observation/Palpation  Posture: Fair (with RW)  Observation: Pt on 3LO2 via NC, resting SaO2 is 93%, stayed at 93% with conversation and decreased to 91% with activity  Edema: BLE's  Scar: Pt has previous R TKA scar      Safety Devices  Type of Devices: Call light within reach; Chair alarm in place;Gait belt; Heels elevated for pressure relief;Patient at risk for falls;Nurse notified (BLE's elevated on stool/pillow under and pt was edu on benefits of elevation and B ankle AROM/pumping as able to reduce swelling. Pt with good understanding.)      Bed Mobility Training  Bed Mobility Training: No (Pt up in chair upon arrival and requested to stay up)  Balance  Sitting:  (SBA to CGA when pt was bending down to alejandro B socks)  Standing:  (min assist with RW)  Transfer Training  Transfer Training: Yes  Overall Level of Assistance: Minimum assistance (with RW)  Interventions: Verbal cues; Safety awareness training; Tactile cues (MOD cues/tactile assist for pacing, pursed lip breathing, scanning, RW safety/mgt, B hand placement reaching back to surface with sit to stands and controlled, squaring self/AD up to surface prior to sitting and awareness/assist with lines to increase)  Sit to Stand: Minimum assistance  Stand to Sit: Minimum assistance  Toilet Transfer:  (N/T and pt with no needs during session)        Functional mob   Overall Level of Assistance: Minimum assistance (with RW and bedside chair to sink, sink to around door and back to chair with RW)  Interventions: Verbal cues; Safety awareness training; Tactile cues (MOD cues for upright posture, pacing, looking up and scanning room, RW safety/mgt and staying inside AD and keeping close to body, keeping AD with pt AAT's, as well as awareness/assist with lines to increase overall safety/reduce falls.)     AROM: Within functional limits  Strength: Within functional limits (BUE strength grossly 4 plus/5)  Coordination: Within functional limits  Tone: Normal  Sensation: Impaired (pt denies for BUE's and states she has constant paresthesias in BLE's)      ADL  Feeding: Independent  Grooming: Setup;Contact guard assistance (to stand at sink approx 4 plus mins for oral care; cues for proper AD position vs pushing off to the side)  UE Bathing: Setup;Stand by assistance  LE Bathing: Setup;Minimal assistance (for thoroughness)  UE Dressing: Setup;Minimal assistance (with hosp gown)  LE Dressing: Setup;Minimal assistance (Pt was able to alejandro B socks bending down/crossing tech with increased time while seated in chair; min assist to adjust R only. Pt was edu on benefits of sock aid use to increase I/ease and for EC/WS tech. Pt agreeable to trial in future sessions)  Toileting: Setup; Moderate assistance (pt states she wears depends at home due to incontinence issues)     Activity Tolerance  Activity Tolerance: Patient limited by endurance; fair minus and stand diana 4-5 mins with RW            Cognition  Overall Cognitive Status: Exceptions  Arousal/Alertness: Appropriate responses to stimuli  Following Commands:  Follows all commands without difficulty  Attention Span: Attends with cues to redirect  Memory: Decreased short term memory  Safety Judgement: Decreased awareness of need for safety;Decreased awareness of need for assistance  Problem Solving: Assistance required to identify errors made;Assistance required to correct errors made;Decreased awareness of errors  Insights: Decreased awareness of deficits  Initiation: Does not require cues  Sequencing: Requires cues for some  Orientation  Overall Orientation Status: Impaired  Orientation Level: Oriented to place;Oriented to situation;Disoriented to place;Oriented to time  Perception  Overall Perceptual Status: [facility]            Education Given To: Patient  Education Provided: Role of Therapy;Plan of Care;Transfer Training;Energy Conservation; Fall Prevention Strategies  Education Provided Comments: pursed lip breathing, safety in function, call light use, recommendations for continued therapy, edema mgt tech, postural control  Education Method: Demonstration;Verbal  Barriers to Learning: Cognition  Education Outcome: Verbalized understanding;Continued education needed                                                                     AM-PAC Score-18             Goals  Short Term Goals  Time Frame for Short term goals: by discharge, pt to demo  Short Term Goal 1: I with BUE HEP with use of handouts to maintain functional use. Short Term Goal 2: UB ADL to set up and LB ADL to SUP with use of AD/AE as needed. Short Term Goal 3: toileting tasks with use of grab bar/AD to SUP. Short Term Goal 4: bed mob tasks with use of rail as needed to MOD I-I. Short Term Goal 5: ADL transfers and functional mob with AD to SUP. Long Term Goals  Long Term Goal 1: Pt to stand with SUP and AD diana > 6 mins as able to reduce falls in function. Long Term Goal 2: Pt/caregivers to be I with COVID edu, EC/WS and fall prevention, pressure relief, and DME/AE and AD recommendations with use of handouts. Patient Goals   Patient goals : Pt states she wants to get better and go home!        Therapy Time   Individual Concurrent Group Co-treatment   Time In 1045 (plus 10 min chart review/nursing communication)         Time Out 1140         Minutes 55=65 mins total          Timed Code Treatment Minutes: [address] [place] [address], OT

## 2022-07-08 VITALS
DIASTOLIC BLOOD PRESSURE: 63 MMHG | WEIGHT: 202.6 LBS | HEART RATE: 87 BPM | OXYGEN SATURATION: 95 % | BODY MASS INDEX: 33.76 KG/M2 | TEMPERATURE: 97.7 F | HEIGHT: 65 IN | RESPIRATION RATE: 18 BRPM | SYSTOLIC BLOOD PRESSURE: 167 MMHG

## 2022-07-08 LAB
ABSOLUTE EOS #: 0 K/UL (ref 0–0.4)
ABSOLUTE IMMATURE GRANULOCYTE: 0.01 K/UL (ref 0–0.3)
ABSOLUTE LYMPH #: 0.27 K/UL (ref 1–4.8)
ABSOLUTE MONO #: 0.02 K/UL (ref 0.2–0.8)
ANION GAP SERPL CALCULATED.3IONS-SCNC: 12 MMOL/L (ref 9–17)
ATYPICAL LYMPHOCYTE ABSOLUTE COUNT: 0.02 K/UL
ATYPICAL LYMPHOCYTES: 3 %
BASOPHILS # BLD: 0 %
BASOPHILS ABSOLUTE: 0 K/UL (ref 0–0.2)
BUN BLDV-MCNC: 22 MG/DL (ref 8–23)
BUN/CREAT BLD: 25 (ref 9–20)
CALCIUM SERPL-MCNC: 9.2 MG/DL (ref 8.6–10.4)
CHLORIDE BLD-SCNC: 99 MMOL/L (ref 98–107)
CO2: 25 MMOL/L (ref 20–31)
CREAT SERPL-MCNC: 0.87 MG/DL (ref 0.5–0.9)
EOSINOPHILS RELATIVE PERCENT: 0 % (ref 1–4)
GFR AFRICAN AMERICAN: >60 ML/MIN
GFR NON-AFRICAN AMERICAN: >60 ML/MIN
GFR SERPL CREATININE-BSD FRML MDRD: ABNORMAL ML/MIN/{1.73_M2}
GLUCOSE BLD-MCNC: 123 MG/DL (ref 65–105)
GLUCOSE BLD-MCNC: 124 MG/DL (ref 65–105)
GLUCOSE BLD-MCNC: 131 MG/DL (ref 65–105)
GLUCOSE BLD-MCNC: 165 MG/DL (ref 70–99)
HCT VFR BLD CALC: 40.4 % (ref 36.3–47.1)
HEMOGLOBIN: 12.6 G/DL (ref 11.9–15.1)
IMMATURE GRANULOCYTES: 2 %
LYMPHOCYTES # BLD: 56 % (ref 24–44)
MCH RBC QN AUTO: 28.7 PG (ref 25.2–33.5)
MCHC RBC AUTO-ENTMCNC: 31.2 G/DL (ref 28.4–34.8)
MCV RBC AUTO: 92 FL (ref 82.6–102.9)
MONOCYTES # BLD: 3 % (ref 1–7)
MORPHOLOGY: ABNORMAL
NRBC AUTOMATED: 0 PER 100 WBC
PDW BLD-RTO: 13 % (ref 11.8–14.4)
PLATELET # BLD: 215 K/UL (ref 138–453)
PMV BLD AUTO: 9.5 FL (ref 8.1–13.5)
POTASSIUM SERPL-SCNC: 3.6 MMOL/L (ref 3.7–5.3)
RBC # BLD: 4.39 M/UL (ref 3.95–5.11)
SEG NEUTROPHILS: 36 % (ref 36–66)
SEGMENTED NEUTROPHILS ABSOLUTE COUNT: 0.18 K/UL (ref 1.8–7.7)
SODIUM BLD-SCNC: 136 MMOL/L (ref 135–144)
WBC # BLD: 0.5 K/UL (ref 3.5–11.3)

## 2022-07-08 PROCEDURE — 94640 AIRWAY INHALATION TREATMENT: CPT

## 2022-07-08 PROCEDURE — 94761 N-INVAS EAR/PLS OXIMETRY MLT: CPT

## 2022-07-08 PROCEDURE — 99232 SBSQ HOSP IP/OBS MODERATE 35: CPT | Performed by: INTERNAL MEDICINE

## 2022-07-08 PROCEDURE — 97110 THERAPEUTIC EXERCISES: CPT

## 2022-07-08 PROCEDURE — 6370000000 HC RX 637 (ALT 250 FOR IP): Performed by: INTERNAL MEDICINE

## 2022-07-08 PROCEDURE — 2060000000 HC ICU INTERMEDIATE R&B

## 2022-07-08 PROCEDURE — 2580000003 HC RX 258: Performed by: NURSE PRACTITIONER

## 2022-07-08 PROCEDURE — 82947 ASSAY GLUCOSE BLOOD QUANT: CPT

## 2022-07-08 PROCEDURE — 6370000000 HC RX 637 (ALT 250 FOR IP): Performed by: NURSE PRACTITIONER

## 2022-07-08 PROCEDURE — 36415 COLL VENOUS BLD VENIPUNCTURE: CPT

## 2022-07-08 PROCEDURE — 2700000000 HC OXYGEN THERAPY PER DAY

## 2022-07-08 PROCEDURE — 97530 THERAPEUTIC ACTIVITIES: CPT

## 2022-07-08 PROCEDURE — 85025 COMPLETE CBC W/AUTO DIFF WBC: CPT

## 2022-07-08 PROCEDURE — 80048 BASIC METABOLIC PNL TOTAL CA: CPT

## 2022-07-08 RX ORDER — ALBUTEROL SULFATE 90 UG/1
2 AEROSOL, METERED RESPIRATORY (INHALATION) 4 TIMES DAILY PRN
Qty: 18 G | Refills: 1 | Status: SHIPPED | OUTPATIENT
Start: 2022-07-08

## 2022-07-08 RX ORDER — CEFUROXIME AXETIL 500 MG/1
500 TABLET ORAL EVERY 12 HOURS SCHEDULED
Status: DISCONTINUED | OUTPATIENT
Start: 2022-07-08 | End: 2022-07-09 | Stop reason: HOSPADM

## 2022-07-08 RX ORDER — CEFUROXIME AXETIL 500 MG/1
500 TABLET ORAL EVERY 12 HOURS SCHEDULED
Qty: 10 TABLET | Refills: 0 | Status: SHIPPED | OUTPATIENT
Start: 2022-07-08 | End: 2022-07-12 | Stop reason: ALTCHOICE

## 2022-07-08 RX ORDER — PREDNISONE 20 MG/1
40 TABLET ORAL DAILY
Qty: 6 TABLET | Refills: 0 | Status: SHIPPED | OUTPATIENT
Start: 2022-07-09 | End: 2022-07-12

## 2022-07-08 RX ADMIN — APIXABAN 5 MG: 5 TABLET, FILM COATED ORAL at 22:57

## 2022-07-08 RX ADMIN — ROPINIROLE HYDROCHLORIDE 1 MG: 1 TABLET, FILM COATED ORAL at 09:50

## 2022-07-08 RX ADMIN — APIXABAN 5 MG: 5 TABLET, FILM COATED ORAL at 09:51

## 2022-07-08 RX ADMIN — LOSARTAN POTASSIUM 50 MG: 50 TABLET, FILM COATED ORAL at 09:51

## 2022-07-08 RX ADMIN — CEFUROXIME AXETIL 500 MG: 500 TABLET ORAL at 22:57

## 2022-07-08 RX ADMIN — IPRATROPIUM BROMIDE AND ALBUTEROL SULFATE 1 AMPULE: 2.5; .5 SOLUTION RESPIRATORY (INHALATION) at 07:45

## 2022-07-08 RX ADMIN — FAMOTIDINE 20 MG: 20 TABLET, FILM COATED ORAL at 09:50

## 2022-07-08 RX ADMIN — PREDNISONE 40 MG: 20 TABLET ORAL at 09:50

## 2022-07-08 RX ADMIN — IPRATROPIUM BROMIDE AND ALBUTEROL SULFATE 1 AMPULE: 2.5; .5 SOLUTION RESPIRATORY (INHALATION) at 20:17

## 2022-07-08 RX ADMIN — FERROUS SULFATE TAB EC 325 MG (65 MG FE EQUIVALENT) 325 MG: 325 (65 FE) TABLET DELAYED RESPONSE at 09:50

## 2022-07-08 RX ADMIN — SODIUM CHLORIDE, PRESERVATIVE FREE 10 ML: 5 INJECTION INTRAVENOUS at 09:50

## 2022-07-08 RX ADMIN — FAMOTIDINE 20 MG: 20 TABLET, FILM COATED ORAL at 22:57

## 2022-07-08 RX ADMIN — AMLODIPINE BESYLATE 5 MG: 5 TABLET ORAL at 09:51

## 2022-07-08 RX ADMIN — ROPINIROLE HYDROCHLORIDE 1 MG: 1 TABLET, FILM COATED ORAL at 23:00

## 2022-07-08 RX ADMIN — CITALOPRAM HYDROBROMIDE 40 MG: 20 TABLET ORAL at 09:51

## 2022-07-08 ASSESSMENT — ENCOUNTER SYMPTOMS
ABDOMINAL DISTENTION: 0
SHORTNESS OF BREATH: 1
NAUSEA: 0
EYE PAIN: 0
CONSTIPATION: 0
COUGH: 1

## 2022-07-08 ASSESSMENT — PAIN SCALES - GENERAL: PAINLEVEL_OUTOF10: 0

## 2022-07-08 NOTE — PROGRESS NOTES
Home Oxygen Evaluation    Home Oxygen Evaluation completed. Patient is on 2 liters per minute via nasal cannula.   Resting SpO2 = 95%  Resting SpO2 on room air = 87%    SOLOMON CARTER, ADRIANA  12:04 PM

## 2022-07-08 NOTE — CONSULTS
[facility] Hematology and Oncology - Consult Note  7/8/2022, 9:56 AM    Admit Date: 7/6/2022  Referring Physician: Champ Garcia DO   PCP: Roel Ramirez MD    Reason for Consult: Neutropenia, breast cancer. Consideration for Neupogen    History of Present Illness:   66year old female with history of HTN, peripheral neuropathy, arthritis, GERD, HTN, cataracts, had MRI 2020  that showed disc disease however, did also show abnormal bone marrow uptake. Negative work up at that time. Then in August of 2021 was diagnosed left sided breast cancer T2N0 ER + [place] + Coj5hqr negative, s/p left lumpectomy SLNBX on 8/31/2021, Grade 3. Low Oncotype score did not require chemotherapy but did complete adjuvant radiation to the breast October 2021. On Arimidex anti hormonal therapy. Follows with Dr. Paulina Johns. Presented to the emergency room on 07/06/2022 with shortness of breath was tested COVID positive pneumonia positive admitted to the hospital for management of COVID-19 infection. She is leukopenic and neutropenic and we are consulted for advice regarding Neupogen growth factor given acute infection. Impression:   1. COVID-19 pneumonia  2. Leukopenia with neutropenia since April 2022 with normal hemoglobin and normal platelet count. Uncertain cause of Leukopenia either reactive or whether bone marrow disorder. 3. History of breast cancer, ER positive, on Arimidex. Underwent lumpectomy and radiation therapy in 2021. Plan:   1. Patient is already scheduled for a bone marrow biopsy to evaluate Leukopenia next Tuesday 07/12/2022 at [address] [place] [address] Radiology department. Per IR staff it is unlikely she would be able to get in any sooner for this test.  She is afebrile. We recommend she go ahead and proceed with the bone marrow biopsy as scheduled. 2.  If patient was febrile we may agree with the dose of Neupogen.   As it is, she is stable and afebrile, and we will hold off with this medication for now  3. Note plans for discharge possibly today as patient has oxygen available at home already, and her COVID symptoms are mild. We have no objection. 4. Will arrange prompt follow-up with Dr. Wilmer Bear as outpatient to review bone marrow results      PMH:   Past Medical History:   Diagnosis Date    [facility] [address]     Hypertension     Kidney stones     Oxygen desaturation during sleep     Pulmonary embolism ([facility] [address]     orginated in knee traveled to lung        PSH:   Past Surgical History:   Procedure Laterality Date    BREAST SURGERY      CHOLECYSTECTOMY      HYSTERECTOMY (CERVIX STATUS UNKNOWN)      JOINT REPLACEMENT Right 01/13/2020    LITHOTRIPSY      ROTATOR CUFF REPAIR Bilateral         Allergies: Allergies   Allergen Reactions    Crestor [Rosuvastatin]      myalgia    Lisinopril      cough    Pcn [Penicillins] Hives        Home Meds:  Medications Prior to Admission: azithromycin (ZITHROMAX) 500 MG tablet, Take 1 tablet by mouth daily for 5 days  benzonatate (TESSALON) 100 MG capsule, Take 1 capsule by mouth 3 times daily as needed for Cough  amLODIPine (NORVASC) 5 MG tablet, Take 5 mg by mouth daily  citalopram (CELEXA) 40 MG tablet, Take 40 mg by mouth daily  apixaban (ELIQUIS) 5 MG TABS tablet, Take 5 mg by mouth 2 times daily  ferrous sulfate (IRON 325) 325 (65 Fe) MG tablet, Take 325 mg by mouth daily (with breakfast)  losartan (COZAAR) 50 MG tablet, Take 50 mg by mouth daily  omeprazole (PRILOSEC) 20 MG delayed release capsule, Take 20 mg by mouth as needed  rOPINIRole (REQUIP) 1 MG tablet, Take 1 mg by mouth 2 times daily  diazePAM (VALIUM) 5 MG tablet, Take 5 mg by mouth as needed for Anxiety. Indications: 60 mins prior to injection therapy  gabapentin (NEURONTIN) 300 MG capsule, Take 300 mg by mouth as needed. Cholecalciferol (VITAMIN D3) 1.25 MG (09852 UT) CAPS, Take by mouth as needed     Social History:  Social History     Socioeconomic History    Marital status:       Spouse name: Not on file    Number of children: Not on file    Years of education: Not on file    Highest education level: Not on file   Occupational History    Not on file   Tobacco Use    Smoking status: Former Smoker    Smokeless tobacco: Never Used    Tobacco comment: quit 1991   Substance and Sexual Activity    Alcohol use: Not Currently    Drug use: Not Currently    Sexual activity: Not on file   Other Topics Concern    Not on file   Social History Narrative    Not on file     Social Determinants of Health     Financial Resource Strain:     Difficulty of Paying Living Expenses: Not on file   Food Insecurity:     Worried About [address] in the Last Year: Not on file    Guillermo of Food in the Last Year: Not on file   Transportation Needs:     Lack of Transportation (Medical): Not on file    Lack of Transportation (Non-Medical): Not on file   Physical Activity:     Days of Exercise per Week: Not on file    Minutes of Exercise per Session: Not on file   Stress:     Feeling of Stress : Not on file   Social Connections:     Frequency of Communication with Friends and Family: Not on file    Frequency of Social Gatherings with Friends and Family: Not on file    Attends Yazidism Services: Not on file    Active Member of [address] [place] [address] or Organizations: Not on file    Attends Club or Organization Meetings: Not on file    Marital Status: Not on file   Intimate Partner Violence:     Fear of Current or Ex-Partner: Not on file    Emotionally Abused: Not on file    Physically Abused: Not on file    Sexually Abused: Not on file   Housing Stability:     Unable to Pay for Housing in the Last Year: Not on file    Number of [place] in the Last Year: Not on file    Unstable Housing in the Last Year: Not on file        Review of Systems:   Review of Systems   Constitutional: Positive for appetite change and fatigue. Negative for fever. HENT: Negative for mouth sores and nosebleeds.     Eyes: Negative for pain and visual disturbance. Respiratory: Positive for cough and shortness of breath. Gastrointestinal: Negative for abdominal distention, constipation and nausea. Genitourinary: Negative for dysuria and frequency. Musculoskeletal: Positive for arthralgias. Skin: Negative for pallor and rash. Neurological: Negative for dizziness and headaches. Psychiatric/Behavioral: Negative for behavioral problems. Physical Examination :   BP (!) 141/62   Pulse 83   Temp 97.3 °F (36.3 °C) (Oral)   Resp 16   Ht 5' 5\" (1.651 m)   Wt 202 lb 9.6 oz (91.9 kg)   SpO2 91%   BMI 33.71 kg/m²    Temperature Range: Temp: 97.3 °F (36.3 °C) Temp  Av.6 °F (36.4 °C)  Min: 97.3 °F (36.3 °C)  Max: 98.1 °F (36.7 °C)  Weight change:      Physical Exam   Patient not physically examined today due to COVID isolation status. Discussed with patient by phone. Discussed with RN and attending physician. Laboratory data:     Recent Labs     22  1606   WBC 1.0*   HGB 13.8   HCT 43.8      MCV 91.6   NEUTROABS 0.08*       No results for input(s): PROTIME, INR, PTT, DDIMER in the last 72 hours. Recent Labs     22  1606 22  0523   K 3.9 4.1   CL 98 101   CO2 28 28   BUN 17 17   CREATININE 0.72 0.76   CALCIUM 9.1 8.7       TSH: No results found for: TSH  VITAMIN B12: No results found for: [id number]  FOLATE:  No results found for: FOLATE  IRON: No results found for: IRON, TIBC, FERRITIN  FIBRINOGEN: No results found for: FIBRINOGEN     Imaging Studies:   XR CHEST PORTABLE    Result Date: 2022  Left lower lobe area of consolidative change on recent chest CT is not well visualized radiographically due to the overlying mediastinum. Lungs are otherwise clear. XR CHEST PORTABLE    Result Date: 2022  Unremarkable portable chest radiograph.         Medications:      ipratropium-albuterol  1 ampule Inhalation BID    insulin lispro  0-6 Units SubCUTAneous TID WC    insulin lispro 0-3 Units SubCUTAneous Nightly    predniSONE  40 mg Oral Daily    famotidine  20 mg Oral BID    amLODIPine  5 mg Oral Daily    apixaban  5 mg Oral BID    citalopram  40 mg Oral Daily    ferrous sulfate  325 mg Oral Daily with breakfast    losartan  50 mg Oral Daily    rOPINIRole  1 mg Oral BID    sodium chloride flush  5-40 mL IntraVENous 2 times per day    cefTRIAXone (ROCEPHIN) IV  1,000 mg IntraVENous Q24H    azithromycin  500 mg IntraVENous Q24H     Alban Dutton, APRN - [address] Hematology and Oncology

## 2022-07-08 NOTE — PROGRESS NOTES
CLINICAL PHARMACY NOTE: MEDS TO BEDS    Total # of Prescriptions Filled: 3   The following medications were delivered to the patient:  · Albuterol sul hfa 108 aers inhaler  · Prednisone 20mg  · Cefuroxime axe 500mg    Additional Documentation:

## 2022-07-08 NOTE — CARE COORDINATION
Discharge Planning:    Writer has attempted to locate patient's O2 supplier to no avail. The following agency's do not have her on service:    [facility]  [facility]  [employer]  [address]    Patient says she can call writer when she returns home and inform writer of O2 supplier. However, patient qualifies for continuous O2 and does not have anyone that is able to bring a portable tank. Notified Rabia/KASSIDY.     3:06pm    Writer contacted patient's daughter, Chichi Wisdom, with patient's permission and she informed writer that it was supplied by [employer] [facility] [place]. Writer contacted [facility] and they were able to confirm they are her current O2 supplier. There was a previous misunderstanding. Face sheet, O2 testing and new Rx faxed to [facility] [place] at [phone]. [facility] to deliver portable tank to patient's room prior to discharge. Notified Ryan Mijares of patient's discharge today. Writer to fax Jose Pulido to [phone].

## 2022-07-08 NOTE — PROGRESS NOTES
[facility]  Office: [address], DO, Carloz Harmon, DO, Mitzy Rubi, DO, Roseanne Calvo Blood, DO, Ethan Reeder MD, Martha Carrera MD, Guero Arizmendi MD, Haylie Crowder MD, Harrison Frias MD, Jeromy Rouse MD, Paul Gould MD, Pablo Lanier, DO, Kesha Matthews MD,  Janina Saenz MD, Emily Echavarria MD, Denia Armijo DO, Dedrick Moss MD, Gricelda Patel MD, Enedina Carter DO, Sabina Franco MD, Elliot Araujo MD, John Sequeira, [facility], [facility], CNP, Kerry Interiano, CNP, Maricruz Arias, CNP, Raya Hayes, CNP, Maximiliano Powell, CNP, Colin Sanabria, PA-C, Cleone Denver, [facility], Yessi Caraballo, [facility], Baljit Carter, CNP, Toña Shields, CNP, Mich Mojica, CNS, Luis Robertson, [facility], Trip Cruz, CNP, Hayden Tello, CNP, [facility], [facility]    Progress Note    7/8/2022    9:40 AM    Name:   Ab Rdz  MRN:     [id number]     Acct:      [de-identified]   Room:   [address] [place] [address] Day:  2  Admit Date:  7/6/2022  3:34 PM    PCP:   Chyna Alfonso MD  Code Status:  Full Code    Subjective:     C/C:   Chief Complaint   Patient presents with    Shortness of Breath     covid positive, pneumonia positive     Interval History Status: improved. Patient is resting calmly, decreasing shortness of breath and cough. Denies any chest pain, nausea or vomiting, fevers or chills. Brief History: This is a 58-year-old female who presents with a complaint of shortness of breath with continued cough after recent diagnosis of COVID-19. She is fully vaccinated and has had 3 or 4 booster shots due to her cancer and ongoing treatment. She was diagnosed with COVID-19 on June 28 and has been treated with Paxlovid as outpatient. She has improvement has continued shortness of breath and cough and higher oxygen requirements. She has chronic home oxygen therapy with nocturnal use only at 2 L nasal cannula. She has been more fatigued and sleeping more during the day and using her oxygen throughout the day as well. Other than the antiviral agent she has not had any other treatment for COVID. Shortly after her initial diagnosis she had an evaluation for worsening shortness of breath on July 4 in the emergency [address] [place] [address] ER. She underwent routine investigation with CT scan of the chest and x-rays at that time and was discharged home. She was advised by her PCP to come to the emergency room due to ongoing symptoms. Review of Systems:     Constitutional:  negative for chills, fevers, sweats  Respiratory:  negative for cough, dyspnea on exertion, shortness of breath, wheezing  Cardiovascular:  negative for chest pain, chest pressure/discomfort, lower extremity edema, palpitations  Gastrointestinal:  negative for abdominal pain, constipation, diarrhea, nausea, vomiting  Neurological:  negative for dizziness, headache    Medications: Allergies:     Allergies   Allergen Reactions    Crestor [Rosuvastatin]      myalgia    Lisinopril      cough    Pcn [Penicillins] Hives       Current Meds:   Scheduled Meds:    ipratropium-albuterol  1 ampule Inhalation BID    insulin lispro  0-6 Units SubCUTAneous TID WC    insulin lispro  0-3 Units SubCUTAneous Nightly    predniSONE  40 mg Oral Daily    famotidine  20 mg Oral BID    amLODIPine  5 mg Oral Daily    apixaban  5 mg Oral BID    citalopram  40 mg Oral Daily    ferrous sulfate  325 mg Oral Daily with breakfast    losartan  50 mg Oral Daily    rOPINIRole  1 mg Oral BID    sodium chloride flush  5-40 mL IntraVENous 2 times per day    cefTRIAXone (ROCEPHIN) IV  1,000 mg IntraVENous Q24H    azithromycin  500 mg IntraVENous Q24H     Continuous Infusions:    dextrose      sodium chloride       PRN Meds: glucose, dextrose bolus **OR** dextrose bolus, glucagon (rDNA), dextrose, guaiFENesin-codeine, benzonatate, sodium chloride flush, sodium chloride, ondansetron **OR** ondansetron, polyethylene glycol, acetaminophen **OR** acetaminophen, albuterol    Data:     Past Medical History:   has a past medical history of Cancer ([facility] [address] Hypertension, Kidney stones, Oxygen desaturation during sleep, and Pulmonary embolism ([facility] [address] Social History:   reports that she has quit smoking. She has never used smokeless tobacco. She reports previous alcohol use. She reports previous drug use. Family History:   Family History   Problem Relation Age of Onset    Cancer Sister     Cancer Brother     Cancer Brother     Cancer Brother     Cancer Sister     Cancer Niece     Cancer Nephew     Cancer Niece     Cancer Nephew     Cancer Nephew     Cancer Nephew        Vitals:  BP (!) 141/62   Pulse 83   Temp 97.3 °F (36.3 °C) (Oral)   Resp 16   Ht 5' 5\" (1.651 m)   Wt 202 lb 9.6 oz (91.9 kg)   SpO2 91%   BMI 33.71 kg/m²   Temp (24hrs), Av.6 °F (36.4 °C), Min:97.3 °F (36.3 °C), Max:98.1 °F (36.7 °C)    Recent Labs     22  1656 22  0731   POCGLU 140* 159* 123*       I/O (24Hr):     Intake/Output Summary (Last 24 hours) at 2022 0940  Last data filed at 2022 0924  Gross per 24 hour   Intake 1040 ml   Output --   Net 1040 ml       Labs:  Hematology:  Recent Labs     22  16022  0523   WBC 1.0*  --    RBC 4.78  --    HGB 13.8  --    HCT 43.8  --    MCV 91.6  --    MCH 28.9  --    MCHC 31.5  --    RDW 13.0  --      --    MPV 9.7  --    CRP  --  24.4*     Chemistry:  Recent Labs     22  1606 22  0523    137   K 3.9 4.1   CL 98 101   CO2 28 28   GLUCOSE 116* 252*   BUN 17 17   CREATININE 0.72 0.76   ANIONGAP 10 8*   LABGLOM >60 >60   GFRAA >60 >60   CALCIUM 9.1 8.7     Recent Labs     22  1656 22  0731   POCGLU 140* 159* 123*     ABG:No results found for: POCPH, PHART, PH, POCPCO2, PDS0VCF, PCO2, POCPO2, PO2ART, PO2, POCHCO3, ENT3SAG, HCO3, NBEA, PBEA, BEART, BE, THGBART, THB, DRD1BGV, GJQF5TYV, Y1KQCGKQ, O2SAT, FIO2  No results found for: SPECIAL  No results found for: CULTURE    Radiology:  XR CHEST PORTABLE    Result Date: 7/7/2022  Left lower lobe area of consolidative change on recent chest CT is not well visualized radiographically due to the overlying mediastinum. Lungs are otherwise clear. XR CHEST PORTABLE    Result Date: 7/6/2022  Unremarkable portable chest radiograph. CT CHEST PULMONARY EMBOLISM W CONTRAST    Result Date: 7/4/2022  No evidence of pulmonary embolism. Mild left lower lobe pneumonia. Physical Examination:        General appearance:  alert, cooperative and no distress  Mental Status:  oriented to person, place and time and normal affect  Lungs:  clear to auscultation bilaterally, normal effort  Heart:  regular rate and rhythm, no murmur  Abdomen:  soft, nontender, nondistended, normal bowel sounds, no masses, hepatomegaly, splenomegaly  Extremities:  no edema, redness, tenderness in the calves  Skin:  no gross lesions, rashes, induration    Assessment:        Hospital Problems           Last Modified POA    * (Principal) COVID-19 virus infection 7/7/2022 Yes    Chemotherapy-induced neutropenia ([address] 7/7/2022 Yes    Primary hypertension 7/7/2022 Yes    Hypoxia 7/7/2022 Yes    Hyperglycemia 7/7/2022 Yes    Carcinoma of upper-outer quadrant of left breast in female, estrogen receptor positive ([address] 7/7/2022 Yes          Plan:        Transition to oral antibiotics  Monitor and control blood pressure  GI and DVT prophylaxis  Trend glucose  PT and OT  Patient was evaluated today for the diagnosis of COVID 19. I entered a DME order for home oxygen because the diagnosis and testing requires the patient to have supplemental oxygen. Condition will improve or be benefited by oxygen use. The patient is  able to perform good mobility in a home setting and therefore does require the use of a portable oxygen system.   The need for this equipment was discussed with the patient and she understands and is in agreement.   Discharge planning, home today with outpatient bone marrow biopsy as planned next Tuesday    Yamileth Newton [address] [place] [address],   7/8/2022  9:40 AM

## 2022-07-08 NOTE — PROGRESS NOTES
Physical Therapy  DATE: 2022    NAME: Duard Favre  MRN: [id number]   : 1950    Patient not seen this date for Physical Therapy due to:      [x] Cancel by RN or physician due to:  RN reports no PT as the pt is agitated over misunderstanding regarding discharge. Nurse reports pt doing well with mobility. [] Hemodialysis    [] Critical Lab Value Level     [] Blood transfusion in progress    [] Acute or unstable cardiovascular status   _MAP < 55 or more than >115  _HR < 40 or > 130    [] Acute or unstable pulmonary status   -FiO2 > 60%   _RR < 5 or >40    _O2 sats < 85%    [] Strict Bedrest    [] Off Unit for surgery or procedure    [] Off Unit for testing       [] Pending imaging to R/O fracture    [] Refusal by Patient      [] Other      [] PT being discontinued at this time. Patient independent. No further needs. [] PT being discontinued at this time as the patient has been transferred to hospice care. No further needs.       Seema Ortiz, PTA

## 2022-07-08 NOTE — PLAN OF CARE
Problem: Discharge Planning  Goal: Discharge to home or other facility with appropriate resources  7/8/2022 1134 by Miguel Jansen RN  Outcome: Adequate for Discharge     Problem: Pain  Goal: Verbalizes/displays adequate comfort level or baseline comfort level  7/8/2022 1134 by Miguel Jansen RN  Outcome: Adequate for Discharge     Problem: Safety - Adult  Goal: Free from fall injury  7/8/2022 1134 by Miguel Jansen RN  Outcome: Adequate for Discharge     Problem: ABCDS Injury Assessment  Goal: Absence of physical injury  7/8/2022 1134 by Miugel Jansen RN  Outcome: Adequate for Discharge

## 2022-07-08 NOTE — PROGRESS NOTES
Occupational Therapy  Facility/Department: [facility] PROGRESSIVE CARE  Rehabilitation Occupational Therapy Daily Treatment Note    Date: 22  Patient Name: Elsy Weeks       Room: 0003/3841-39  MRN: [id number]  Account: [de-identified]   : 1950  (76 y.o.) Gender: female         Due to recent hospitalization and medical condition, pt would benefit from additional intermittent skilled therapy at time of discharge. Please refer to the AM-PAC score for current functional status. Past Medical History:  has a past medical history of Cancer ([address] Hypertension, Kidney stones, Oxygen desaturation during sleep, and Pulmonary embolism ([facility] [address] Past Surgical History:   has a past surgical history that includes Cholecystectomy; Rotator cuff repair (Bilateral); Lithotripsy; Hysterectomy; Breast surgery; and joint replacement (Right, 2020). Restrictions  Restrictions/Precautions: General Precautions; Fall Risk;Up as Tolerated;Isolation  Other position/activity restrictions: Up as tolerated, droplet+ isol 2* COVID,telemetry, RUE IV, O2 via NC@ 2L/min, ALARMS  Required Braces or Orthoses?: No    Subjective  Subjective: Pt in chair upon arrival and agreeable to therapy. Restrictions/Precautions: General Precautions; Fall Risk;Up as Tolerated;Isolation             Objective     Cognition  Overall Cognitive Status: WFL  Orientation  Overall Orientation Status: Within Functional Limits   Perception  Overall Perceptual Status: WFL                  Functional Mobility  Device: Rolling walker  Activity:  (mobility around room)  Assistance Level: Stand by assist  Skilled Clinical Factors: due to assist with O2 line mgmt  Sit to Stand  Assistance Level: Supervision  Stand to Sit  Assistance Level: Supervision  Skilled Clinical Factors: due to assist with O2 line mgmt   Neuromuscular Education  Neuromuscular education: Yes  NDT Treatment: Gait   OT Exercises  Exercise Treatment: Pt completed B UE AROM exercises seated in chair in all planes/joints as tolerated x15 reps of each ex. Assessment  Assessment  Assessment: Pt tolerated session well and is progressing towards goals and req's only SBA/SUP with functional transfers/mobility and self care tasks. Skilled OT indicated to increase safety and IND with all functional tasks to ensure a safe return to PLOF. Activity Tolerance: Patient tolerated treatment well  Discharge Recommendations: Patient would benefit from continued therapy after discharge  Safety Devices  Safety Devices in place: Yes  Type of devices: All fall risk precautions in place; Left in chair;Nurse notified;Call light within reach; Chair alarm in place;Gait belt    Patient Education  Education  Education Provided: Mobility Training;Transfer Training; Safety  Education Method: Verbal  Barriers to Learning: None  Education Outcome: Verbalized understanding;Demonstrated understanding    Plan  Plan  Times per Week: 4-5x/week 1x/day as diana  Current Treatment Recommendations: Strengthening; Functional mobility training; Endurance training;Pain management; Neuromuscular re-education; Safety education & training;Patient/Caregiver education & training;Equipment evaluation, education, & procurement;Self-Care / ADL; Home management training;Cognitive/Perceptual training;Positioning    Goals  Patient Goals   Patient goals : Pt states she wants to get better and go home! Short Term Goals  Time Frame for Short term goals: by discharge, pt to demo  Short Term Goal 1: I with BUE HEP with use of handouts to maintain functional use. Short Term Goal 2: UB ADL to set up and LB ADL to SUP with use of AD/AE as needed. Short Term Goal 3: toileting tasks with use of grab bar/AD to SUP. Short Term Goal 4: bed mob tasks with use of rail as needed to MOD I-I. Short Term Goal 5: ADL transfers and functional mob with AD to SUP.   Long Term Goals  Long Term Goal 1: Pt to stand with SUP and AD diana > 6 mins as able to reduce falls in function. Long Term Goal 2: Pt/caregivers to be I with COVID edu, EC/WS and fall prevention, pressure relief, and DME/AE and AD recommendations with use of handouts.     AM-PAC Score        AM-PAC Inpatient Daily Activity Raw Score: 21 (07/08/22 1430)  AM-PAC Inpatient ADL T-Scale Score : 44.27 (07/08/22 1430)  ADL Inpatient CMS 0-100% Score: 32.79 (07/08/22 1430)  ADL Inpatient CMS G-Code Modifier : CJ (07/08/22 1430)      Therapy Time   Individual Concurrent Group Co-treatment   Time In 1335         Time Out 1402         Minutes [address], [facility]

## 2022-07-08 NOTE — DISCHARGE SUMMARY
[facility]  Office: [address], DO, Lulú Cooper, DO, Rosina Alanis, DO, Natali Garner, DO, Astrid Vidal MD, Nicole Polanco MD, Anatoliy Venegas MD, Rosalba Cid MD, Jose Alfredo Dc MD, Josy Santamaria MD, Ольга Mcintosh MD, Kathy Peña, DO, Misha Pereyra MD,  Sonia Russell, DO, Jessica Way MD, Tammy Keller MD, Enriqueta Chen, DO, Leigha Curtis MD, Loi Alegria MD, Kian Rodriges, DO, Saqib Bustillo MD, Melania Hines MD, Mina Franco, [facility], [facility], CNP, Landry Padilla, CNP, Mode Watson, CNP, Danyell Johnson, CNP, Rosio Carrasco, CNP, SEGN HarperC, Waleska Dinero, DNP, Sindi Johnson, CNP, Martin Davila, CNP, Sdira Herrera, CNP, Shade Mcadams, CNS, Tala Espinosa, [facility], Kayla Arce, CNP, Aren Causey, CNP, Lamar Gu, [facility]    Discharge Summary     Patient ID: Dinah Breen  :  1950   MRN: [id number]     ACCOUNT:  [de-identified]   Patient's PCP: Manda Peacock MD  Admit Date: 2022   Discharge Date: 2022     Length of Stay: 2  Code Status:  Full Code  Admitting Physician: Marianne Orozco DO  Discharge Physician: Marianne Orozco DO     Active Discharge Diagnoses:     Hospital Problem Lists:  Principal Problem:    COVID-19 virus infection  Active Problems:    Chemotherapy-induced neutropenia ([facility] [address]    Primary hypertension    Hypoxia    Hyperglycemia    Carcinoma of upper-outer quadrant of left breast in female, estrogen receptor positive ([facility] [address]  Resolved Problems:    * No resolved hospital problems.  *      Admission Condition:  fair     Discharged Condition: stable    Hospital Stay:     Hospital Course:  Dinah Breen is a 66 y.o. female who was admitted for the management of  COVID-19 virus infection , presented to ER with Shortness of Breath (covid positive, pneumonia positive)    This is a 47-year-old female who presents with a complaint of shortness of breath and with continued cough after diagnosis of COVID-19 on June 28.  She was treated as an outpatient with Paxlovid.  Despite treatment she had continued shortness of breath and had increased oxygen requirements at home.  She currently has oxygen ordered at 2 L nasal cannula at nighttime only has been using it throughout the day as well.  Upon evaluation here she is found to be hypoxic and required oxygen throughout the day.  She underwent repeat home oxygen evaluation and did not qualify 2 liters nasal cannula around-the-clock.  Ultimately as she was improving she was continued on oral antibiotics as well as steroids and discharged with new oxygen orders.      Significant therapeutic interventions: As above    Significant Diagnostic Studies:   Labs / Micro:  CBC:   Lab Results   Component Value Date/Time    WBC 0.5 07/08/2022 10:30 AM    RBC 4.39 07/08/2022 10:30 AM    HGB 12.6 07/08/2022 10:30 AM    HCT 40.4 07/08/2022 10:30 AM    MCV 92.0 07/08/2022 10:30 AM    MCH 28.7 07/08/2022 10:30 AM    MCHC 31.2 07/08/2022 10:30 AM    RDW 13.0 07/08/2022 10:30 AM     07/08/2022 10:30 AM     BMP:    Lab Results   Component Value Date/Time    GLUCOSE 165 07/08/2022 10:30 AM     07/08/2022 10:30 AM    K 3.6 07/08/2022 10:30 AM    CL 99 07/08/2022 10:30 AM    CO2 25 07/08/2022 10:30 AM    ANIONGAP 12 07/08/2022 10:30 AM    BUN 22 07/08/2022 10:30 AM    CREATININE 0.87 07/08/2022 10:30 AM    BUNCRER 25 07/08/2022 10:30 AM    CALCIUM 9.2 07/08/2022 10:30 AM    LABGLOM >60 07/08/2022 10:30 AM    GFRAA >60 07/08/2022 10:30 AM    GFR      07/08/2022 10:30 AM        Radiology:  XR CHEST PORTABLE    Result Date: 7/7/2022  Left lower lobe area of consolidative change on recent chest CT is not well visualized radiographically due to the overlying mediastinum. Lungs are otherwise clear.     XR CHEST PORTABLE    Result Date: 7/6/2022  Unremarkable portable chest radiograph.     CT CHEST PULMONARY EMBOLISM W  CONTRAST    Result Date: 7/4/2022  No evidence of pulmonary embolism. Mild left lower lobe pneumonia. Consultations:    Consults:     Final Specialist Recommendations/Findings:   IP CONSULT TO HOSPITALIST  IP CONSULT TO ONCOLOGY      The patient was seen and examined on day of discharge and this discharge summary is in conjunction with any daily progress note from day of discharge. Discharge plan:     Disposition: Home    Physician Follow Up:   PCP 1 week  [place]. [facility] [id number]  [phone]      Home care agency        Requiring Further Evaluation/Follow Up POST HOSPITALIZATION/Incidental Findings: None    Diet: regular diet    Activity: As tolerated    Instructions to Patient: Take medication as prescribed    Discharge Medications:      Medication List      START taking these medications    albuterol sulfate  (90 Base) MCG/ACT inhaler  Commonly known as: Ventolin HFA  Inhale 2 puffs into the lungs 4 times daily as needed for Wheezing     cefUROXime 500 MG tablet  Commonly known as: CEFTIN  Take 1 tablet by mouth every 12 hours for 10 doses     predniSONE 20 MG tablet  Commonly known as: DELTASONE  Take 2 tablets by mouth daily for 3 doses  Start taking on: July 9, 2022        Bc Cobia taking these medications    amLODIPine 5 MG tablet  Commonly known as: NORVASC     azithromycin 500 MG tablet  Commonly known as: ZITHROMAX  Take 1 tablet by mouth daily for 5 days     benzonatate 100 MG capsule  Commonly known as: TESSALON  Take 1 capsule by mouth 3 times daily as needed for Cough     citalopram 40 MG tablet  Commonly known as: CELEXA     diazePAM 5 MG tablet  Commonly known as: VALIUM     Eliquis 5 MG Tabs tablet  Generic drug: apixaban     ferrous sulfate 325 (65 Fe) MG tablet  Commonly known as: IRON 325     gabapentin 300 MG capsule  Commonly known as: NEURONTIN     losartan 50 MG tablet  Commonly known as: COZAAR     omeprazole 20 MG delayed release capsule  Commonly known as: PRILOSEC     rOPINIRole 1 MG tablet  Commonly known as: REQUIP     Vitamin D3 1.25 MG (15903 UT) Caps           Where to Get Your Medications      These medications were sent to [facility] [place] [address]    Phone: [phone]   albuterol sulfate  (90 Base) MCG/ACT inhaler  cefUROXime 500 MG tablet  predniSONE 20 MG tablet         Discharge Procedure Orders   DME Order for Home Oxygen as OP   Order Comments: You must complete the order parameters below and add the medical necessity documentation for this DME in a separate note. Stationary Oxygen Concentrator at 2 lpm via Nasal Cannula    Stationary Prescribed at:  Continuous    Portable Gaseous O2 System and contents at 2 lpm via Nasal Cannula    30-60min/day    Diagnosis: COVID-19  Length of need: 12 Months       Time Spent on discharge is  28 minutes in patient examination, evaluation, counseling as well as medication reconciliation, prescriptions for required medications, discharge plan and follow up. Electronically signed by   Pelon Delacruz DO  7/8/2022  2:36 PM      Thank you Dr. Veronica Marcelo MD for the opportunity to be involved in this patient's care.

## 2022-07-09 NOTE — PROGRESS NOTES
Patient discharged from hospital at [facility]. Discharge instructions explained & given to patient which patient verbalized understanding. INT removed with catheter intact. [facility] area outside of Emergency Department entrance to [facility]. Patient left with all valuables destined for home. No distress noted.

## 2022-07-09 NOTE — PROGRESS NOTES
2 Nadeem Ronquillo to inquire of delay of pickup. Informed that they had used alyssa & but the pickup location was wrong. Will  patient when first available, stating \"hopefully\" in about 20 minutes. Gave nurse's mobile number to call directly when cab arrives at hospital.  Patient informed.

## 2022-07-09 NOTE — RT PROTOCOL NOTE
RT Nebulizer Bronchodilator Protocol Note    There is a bronchodilator order in the chart from a provider indicating to follow the RT Bronchodilator Protocol and there is an Initiate RT Bronchodilator Protocol order as well (see protocol at bottom of note). CXR Findings:  XR CHEST PORTABLE    Result Date: 7/7/2022  Left lower lobe area of consolidative change on recent chest CT is not well visualized radiographically due to the overlying mediastinum. Lungs are otherwise clear. The findings from the last RT Protocol Assessment were as follows:  Smoking: None or smoker <15 pack years  Respiratory Pattern: Dyspnea on exertion or RR 21-25 bpm  Breath Sounds: Slightly diminished and/or crackles  Cough: Strong, spontaneous, non-productive  Indication for Bronchodilator Therapy: Decreased or absent breath sounds  Bronchodilator Assessment Score: 4    Aerosolized bronchodilator medication orders have been revised according to the RT Nebulizer Bronchodilator Protocol below. Respiratory Therapist to perform RT Therapy Protocol Assessment initially then follow the protocol. Repeat RT Therapy Protocol Assessment PRN for score 0-3 or on second treatment, BID, and PRN for scores above 3. No Indications - adjust the frequency to every 6 hours PRN wheezing or bronchospasm, if no treatments needed after 48 hours then discontinue using Per Protocol order mode. If indication present, adjust the RT bronchodilator orders based on the Bronchodilator Assessment Score as indicated below. If a patient is on this medication at home then do not decrease Frequency below that used at home. 0-3 - enter or revise RT bronchodilator order(s) to equivalent RT Bronchodilator order with Frequency of every 4 hours PRN for wheezing or increased work of breathing using Per Protocol order mode.        4-6 - enter or revise RT Bronchodilator order(s) to two equivalent RT bronchodilator orders with one order with BID Frequency and one order with Frequency of every 4 hours PRN wheezing or increased work of breathing using Per Protocol order mode. 7-10 - enter or revise RT Bronchodilator order(s) to two equivalent RT bronchodilator orders with one order with TID Frequency and one order with Frequency of every 4 hours PRN wheezing or increased work of breathing using Per Protocol order mode. 11-13 - enter or revise RT Bronchodilator order(s) to one equivalent RT bronchodilator order with QID Frequency and an Albuterol order with Frequency of every 4 hours PRN wheezing or increased work of breathing using Per Protocol order mode. Greater than 13 - enter or revise RT Bronchodilator order(s) to one equivalent RT bronchodilator order with every 4 hours Frequency and an Albuterol order with Frequency of every 2 hours PRN wheezing or increased work of breathing using Per Protocol order mode. RT to enter RT Home Evaluation for COPD & MDI Assessment order using Per Protocol order mode.     Electronically signed by Margreta Landau, RCP on 7/8/2022 at 8:25 PM

## 2022-07-09 NOTE — PROGRESS NOTES
Call [phone] to ask for ETA, which stated it would be about an hour. Reminded cab company that patient was supposed to be picked up at 2030 & if they could make patient a priority. Cab company stated they have made her a priority & that it would be about an hour. Patient informed.

## 2022-07-09 NOTE — PROGRESS NOTES
Home oxygen delivered outside of patient's room. [employer] called with ETA of 2030. Patient informed.

## 2022-07-09 NOTE — PROGRESS NOTES
2 Nadeem Ronquillo, who stated that cab arrived, and was a no-show. Asked to read back phone number given, which was the wrong number. Repeated nurse's direct mobile number. When cab company read back number, they stated the wrong number again. Repeated mobile number again. Awaiting . Patient informed.

## 2022-07-11 ENCOUNTER — CARE COORDINATION (OUTPATIENT)
Dept: CASE MANAGEMENT | Age: 72
End: 2022-07-11

## 2022-07-11 NOTE — CARE COORDINATION
[facility] Transitions Initial Follow Up Call    Call within 2 business days of discharge: Yes    Patient: Gabriela Monaco Patient : 1950   MRN: [id number]  Reason for Admission: COVID 19  Discharge Date: 22 RARS: Readmission Risk Score: 9.3 ( )      Last Discharge [facility]       Complaint Diagnosis Description Type Department Provider    22 Shortness of Breath COVID-19 . .. ED to Hosp-Admission (Discharged) (ADMITTED) KATERIN Camara, DO; Darryl Bedollaug. .. Spoke with: Amol Lopez, daughter    Facility: [facility] services provided:  Obtained and reviewed discharge summary and/or continuity of care documents     Spoke with patient's daughter who said patient is doing a little better today. She has O2 at home, O2 saturations are running around 95% on RA however when she is relaxed/sleeping she does drop sometimes into the upper 70's. She lives in senior living apartment, daughter is there now. [facility] did they evaluation yesterday, they are planning for once a week visits but may increase after they see how she does post BM biopsy. She has this done tomorrow in IR at [facility]. I did call to verify she can still have this done post COVID, actually official diagnosis was , she had antiviral medication prescribed by her PCP but was having issues with n/v and increased dyspnea. Daughter said she is better today, is eating and drinking without any issues. They deny any needs or concerns at this time. Expressed I would reach out later in the week but to call with any new questions or concerns. They were advised to schedule follow up with PCP within 7 days. Transitions of Care Initial Call    Was this an external facility discharge?  No Discharge Facility: [facility]    Challenges to be reviewed by the provider   Additional needs identified to be addressed with provider: No  none             Method of communication with provider : none    Advance Care Planning:   Does patient have an Advance Directive: not on file; education provided. Care Transition Nurse contacted the family by telephone to perform post hospital discharge assessment. Verified name and  with family as identifiers. Provided introduction to self, and explanation of the CTN role. CTN reviewed discharge instructions, medical action plan and red flags with family who verbalized understanding. Family given an opportunity to ask questions and does not have any further questions or concerns at this time. Were discharge instructions available to patient? Yes. Reviewed appropriate site of care based on symptoms and resources available to patient including: PCP  Specialist. The family agrees to contact the PCP office for questions related to their healthcare. Medication reconciliation was performed with family, who verbalizes understanding of administration of home medications. Advised obtaining a 90-day supply of all daily and as-needed medications. Was patient discharged with a pulse oximeter? no    CTN provided contact information. Plan for follow-up call in 3-5 days based on severity of symptoms and risk factors.   Plan for next call: check on BM biopsy, check O2 saturations, see if appt scheduled for follow up          Care Transitions 24 Hour Call    Schedule Follow Up Appointment with PCP: Completed  Do you have a copy of your discharge instructions?: Yes  Do you have all of your prescriptions and are they filled?: Yes  Have you been contacted by a [facility]?: No  Have you scheduled your follow up appointment?: Yes  How are you going to get to your appointment?: Car - family or friend to transport  Do you feel like you have everything you need to keep you well at home?: Yes  Care Transitions Interventions         Follow Up  Future Appointments   Date Time Provider Estella Monique   2022 10:00 AM STA CT SCAN RM 2 STAZ CT SCAN STA Radiolog       [employer], RN

## 2022-07-12 ENCOUNTER — HOSPITAL ENCOUNTER (OUTPATIENT)
Dept: CT IMAGING | Age: 72
Discharge: HOME | End: 2022-07-14
Payer: MEDICARE

## 2022-07-12 VITALS
DIASTOLIC BLOOD PRESSURE: 50 MMHG | TEMPERATURE: 97.2 F | SYSTOLIC BLOOD PRESSURE: 137 MMHG | OXYGEN SATURATION: 94 % | RESPIRATION RATE: 15 BRPM | WEIGHT: 210 LBS | HEART RATE: 72 BPM | BODY MASS INDEX: 34.99 KG/M2 | HEIGHT: 65 IN

## 2022-07-12 DIAGNOSIS — D61.89: ICD-10-CM

## 2022-07-12 DIAGNOSIS — D64.9 ANEMIA, UNSPECIFIED TYPE: ICD-10-CM

## 2022-07-12 LAB
ABSOLUTE EOS #: 0.01 K/UL (ref 0–0.4)
ABSOLUTE LYMPH #: 0.7 K/UL (ref 1–4.8)
ABSOLUTE MONO #: 0.09 K/UL (ref 0.1–0.8)
ABSOLUTE RETIC #: 0.09 M/UL (ref 0.03–0.08)
BASOPHILS # BLD: 0 % (ref 0–2)
BASOPHILS ABSOLUTE: 0 K/UL (ref 0–0.2)
BLASTS ABSOLUTE COUNT: 0.12 K/UL
BLASTS: 11 %
EOSINOPHILS RELATIVE PERCENT: 1 % (ref 1–4)
HCT VFR BLD CALC: 41.5 % (ref 36.3–47.1)
HEMOGLOBIN: 13.3 G/DL (ref 11.9–15.1)
IMMATURE RETIC FRACT: 13.8 % (ref 2.7–18.3)
INR BLD: 0.9
LYMPHOCYTES # BLD: 64 % (ref 24–44)
MCH RBC QN AUTO: 29.2 PG (ref 25.2–33.5)
MCHC RBC AUTO-ENTMCNC: 32 G/DL (ref 28.4–34.8)
MCV RBC AUTO: 91.2 FL (ref 82.6–102.9)
MONOCYTES # BLD: 8 % (ref 1–7)
MORPHOLOGY: NORMAL
NRBC AUTOMATED: 0 PER 100 WBC
PARTIAL THROMBOPLASTIN TIME: 23.3 SEC (ref 23.9–33.8)
PDW BLD-RTO: 13.1 % (ref 11.8–14.4)
PLATELET # BLD: 174 K/UL (ref 138–453)
PMV BLD AUTO: 9 FL (ref 8.1–13.5)
PROTHROMBIN TIME: 12.2 SEC (ref 11.5–14.2)
RBC # BLD: 4.55 M/UL (ref 3.95–5.11)
RETIC %: 2.1 % (ref 0.5–1.9)
RETIC HEMOGLOBIN: 34.9 PG (ref 28.2–35.7)
SEG NEUTROPHILS: 16 % (ref 36–66)
SEGMENTED NEUTROPHILS ABSOLUTE COUNT: 0.18 K/UL (ref 1.8–7.7)
WBC # BLD: 1.1 K/UL (ref 3.5–11.3)

## 2022-07-12 PROCEDURE — 85730 THROMBOPLASTIN TIME PARTIAL: CPT

## 2022-07-12 PROCEDURE — 85045 AUTOMATED RETICULOCYTE COUNT: CPT

## 2022-07-12 PROCEDURE — 88184 FLOWCYTOMETRY/ TC 1 MARKER: CPT

## 2022-07-12 PROCEDURE — 77012 CT SCAN FOR NEEDLE BIOPSY: CPT

## 2022-07-12 PROCEDURE — 88275 CYTOGENETICS 100-300: CPT

## 2022-07-12 PROCEDURE — 88237 TISSUE CULTURE BONE MARROW: CPT

## 2022-07-12 PROCEDURE — 2709999900 CT BIOPSY BONE MARROW

## 2022-07-12 PROCEDURE — 88264 CHROMOSOME ANALYSIS 20-25: CPT

## 2022-07-12 PROCEDURE — 88300 SURGICAL PATH GROSS: CPT

## 2022-07-12 PROCEDURE — 85610 PROTHROMBIN TIME: CPT

## 2022-07-12 PROCEDURE — 88280 CHROMOSOME KARYOTYPE STUDY: CPT

## 2022-07-12 PROCEDURE — 88185 FLOWCYTOMETRY/TC ADD-ON: CPT

## 2022-07-12 PROCEDURE — 2580000003 HC RX 258: Performed by: RADIOLOGY

## 2022-07-12 PROCEDURE — 6360000002 HC RX W HCPCS: Performed by: RADIOLOGY

## 2022-07-12 PROCEDURE — 7100000011 HC PHASE II RECOVERY - ADDTL 15 MIN

## 2022-07-12 PROCEDURE — 88271 CYTOGENETICS DNA PROBE: CPT

## 2022-07-12 PROCEDURE — 85025 COMPLETE CBC W/AUTO DIFF WBC: CPT

## 2022-07-12 PROCEDURE — 7100000010 HC PHASE II RECOVERY - FIRST 15 MIN

## 2022-07-12 RX ORDER — MIDAZOLAM HYDROCHLORIDE 1 MG/ML
INJECTION INTRAMUSCULAR; INTRAVENOUS
Status: COMPLETED | OUTPATIENT
Start: 2022-07-12 | End: 2022-07-12

## 2022-07-12 RX ORDER — SODIUM CHLORIDE 9 MG/ML
INJECTION, SOLUTION INTRAVENOUS CONTINUOUS
Status: DISCONTINUED | OUTPATIENT
Start: 2022-07-12 | End: 2022-07-15 | Stop reason: HOSPADM

## 2022-07-12 RX ORDER — SODIUM CHLORIDE 9 MG/ML
INJECTION, SOLUTION INTRAVENOUS PRN
Status: DISCONTINUED | OUTPATIENT
Start: 2022-07-12 | End: 2022-07-15 | Stop reason: HOSPADM

## 2022-07-12 RX ORDER — SODIUM CHLORIDE 0.9 % (FLUSH) 0.9 %
5-40 SYRINGE (ML) INJECTION EVERY 12 HOURS SCHEDULED
Status: DISCONTINUED | OUTPATIENT
Start: 2022-07-12 | End: 2022-07-15 | Stop reason: HOSPADM

## 2022-07-12 RX ORDER — FENTANYL CITRATE 50 UG/ML
INJECTION, SOLUTION INTRAMUSCULAR; INTRAVENOUS
Status: COMPLETED | OUTPATIENT
Start: 2022-07-12 | End: 2022-07-12

## 2022-07-12 RX ORDER — SODIUM CHLORIDE 0.9 % (FLUSH) 0.9 %
5-40 SYRINGE (ML) INJECTION PRN
Status: DISCONTINUED | OUTPATIENT
Start: 2022-07-12 | End: 2022-07-15 | Stop reason: HOSPADM

## 2022-07-12 RX ORDER — ACETAMINOPHEN 325 MG/1
650 TABLET ORAL EVERY 4 HOURS PRN
Status: DISCONTINUED | OUTPATIENT
Start: 2022-07-12 | End: 2022-07-15 | Stop reason: HOSPADM

## 2022-07-12 RX ADMIN — SODIUM CHLORIDE: 9 INJECTION, SOLUTION INTRAVENOUS at 08:54

## 2022-07-12 RX ADMIN — FENTANYL CITRATE 50 MCG: 50 INJECTION INTRAMUSCULAR; INTRAVENOUS at 10:46

## 2022-07-12 RX ADMIN — MIDAZOLAM 1 MG: 1 INJECTION INTRAMUSCULAR; INTRAVENOUS at 10:46

## 2022-07-12 ASSESSMENT — PAIN - FUNCTIONAL ASSESSMENT: PAIN_FUNCTIONAL_ASSESSMENT: 0-10

## 2022-07-12 NOTE — PRE SEDATION
Sedation Pre-Procedure Note    Patient Name: Monica Bal   YOB: 1950  Room/Bed: Room/bed info not found  Medical Record Number: [id number]  Date: 7/12/2022   Time: 10:23 AM       Indication:  Leukopenia    Consent: I have discussed with the patient and/or the patient representative the indication, alternatives, and the possible risks and/or complications of the planned procedure and the anesthesia methods. The patient and/or patient representative appear to understand and agree to proceed. Vital Signs:   Vitals:    07/12/22 0823   BP: (!) 148/60   Pulse: 86   Resp: 18   Temp: 97.5 °F (36.4 °C)   SpO2: 94%       Past Medical History:   has a past medical history of Cancer ([facility] [address] COVID-19, Hypertension, Kidney stones, Oxygen desaturation during sleep, and Pulmonary embolism ([facility] [address] Past Surgical History:   has a past surgical history that includes Cholecystectomy; Rotator cuff repair (Bilateral); Lithotripsy; Hysterectomy; Breast surgery; and joint replacement (Right, 01/13/2020). Medications:   Scheduled Meds:    sodium chloride flush  5-40 mL IntraVENous 2 times per day     Continuous Infusions:    sodium chloride      sodium chloride      sodium chloride 100 mL/hr at 07/12/22 0854     PRN Meds: sodium chloride flush, sodium chloride  Home Meds:   Prior to Admission medications    Medication Sig Start Date End Date Taking?  Authorizing Provider   albuterol sulfate HFA (VENTOLIN HFA) 108 (90 Base) MCG/ACT inhaler Inhale 2 puffs into the lungs 4 times daily as needed for Wheezing 7/8/22   Dimitri Diesel DO Ho[facility]   predniSONE (DELTASONE) 20 MG tablet Take 2 tablets by mouth daily for 3 doses  Patient not taking: Reported on 7/12/2022 7/9/22 7/12/22  Janina Camara DO   amLODIPine (NORVASC) 5 MG tablet Take 5 mg by mouth daily    Historical Provider, MD   citalopram (CELEXA) 40 MG tablet Take 40 mg by mouth daily    Historical Provider, MD   apixaban (ELIQUIS) 5 MG TABS tablet Take 5 mg by mouth 2 times daily    Historical Provider, MD   ferrous sulfate (IRON 325) 325 (65 Fe) MG tablet Take 325 mg by mouth daily (with breakfast)    Historical Provider, MD   losartan (COZAAR) 50 MG tablet Take 50 mg by mouth daily    Historical Provider, MD   omeprazole (PRILOSEC) 20 MG delayed release capsule Take 20 mg by mouth in the morning and at bedtime     Historical Provider, MD   rOPINIRole (REQUIP) 1 MG tablet Take 1 mg by mouth 2 times daily    Historical Provider, MD   diazePAM (VALIUM) 5 MG tablet Take 5 mg by mouth as needed for Anxiety. Indications: 60 mins prior to injection therapy    Historical Provider, MD   gabapentin (NEURONTIN) 300 MG capsule Take 300 mg by mouth as needed. Patient not taking: Reported on 7/12/2022    Historical Provider, MD   Cholecalciferol (VITAMIN D3) 1.25 MG (69415 UT) CAPS Take by mouth as needed    Historical Provider, MD     Coumadin Use Last 7 Days:  no  Antiplatelet drug therapy use last 7 days: no  Other anticoagulant use last 7 days: no  Additional Medication Information:  See [facility]      Pre-Sedation Documentation and Exam:   I have reviewed the patient's history and review of systems.     Mallampati Airway Assessment:  Mallampati Class II - (soft palate, fauces & uvula are visible)    Prior History of Anesthesia Complications:   none    ASA Classification:  Class 2 - A normal healthy patient with mild systemic disease    Sedation/ Anesthesia Plan:   intravenous sedation    Medications Planned:   midazolam (Versed) intravenously and fentanyl intravenously    Patient is an appropriate candidate for plan of sedation: yes    Electronically signed by Florentino Garcia MD on 7/12/2022 at 10:23 AM

## 2022-07-12 NOTE — POST SEDATION
Sedation Post Procedure Note    Patient Name: Kaleb Carias   YOB: 1950  Room/Bed: Room/bed info not found  Medical Record Number: [id number]  Date: 7/12/2022   Time: 10:59 AM         Physicians/Assistants: Magnus Louie MD, MD    Procedure Performed:  CT guided random bone marrow biopsy    Post-Sedation Vital Signs:  Vitals:    07/12/22 1056   BP: (!) 155/73   Pulse: 78   Resp: 21   Temp:    SpO2: 100%      Vital signs were reviewed and were stable after the procedure (see flow sheet for vitals)            Complications: none    Electronically signed by Magnus Louie MD on 7/12/2022 at 10:59 AM

## 2022-07-12 NOTE — BRIEF OP NOTE
Brief Postoperative Note    Latanya Mendez  YOB: 1950  [id number]    Pre-operative Diagnosis: Leukopenia    Post-operative Diagnosis: Same    Procedure: CT guided random bone marrow biopsy    Anesthesia: Moderate Sedation    Surgeons/Assistants: Danny    Estimated Blood Loss: less than 50     Complications: None    Specimens: Was Obtained: single 11 G core and aspirate    Electronically signed by Marek Adler MD on 7/12/2022 at 11:00 AM

## 2022-07-12 NOTE — H&P
History and Physical Update    Pt Name: Jahaira Powers  MRN: [id number]  YOB: 1950  Date of evaluation: 7/12/2022      [x] I have reviewed the Consult found in [facility] dated 7/8/22 by Smiley Mills NP which meets the criteria for an Interval History and Physical note and is attached below. Procedure: Bone Marrow Biopsy  Diagnosis: Leukopenia, Neutropenia, Anemia  [x] I have examined  Jahaira Powers and there are no changes to the patient or plans. Last took Eliquis 3 days ago Saturday. Last ate and drank 11pm.  WBC count in preop today 1.1. Denies any fever or chills. States increased fatigue. Vital signs: BP (!) 148/60   Pulse 86   Temp 97.5 °F (36.4 °C) (Temporal)   Resp 18   Ht 5' 5\" (1.651 m)   Wt 210 lb (95.3 kg)   SpO2 94%   BMI 34.95 kg/m²     Allergies:  Crestor [rosuvastatin], Lisinopril, and Pcn [penicillins]    Medications:   Current Outpatient Medications on File Prior to Encounter   Medication Sig Dispense Refill    albuterol sulfate HFA (VENTOLIN HFA) 108 (90 Base) MCG/ACT inhaler Inhale 2 puffs into the lungs 4 times daily as needed for Wheezing 18 g 1    cefUROXime (CEFTIN) 500 MG tablet Take 1 tablet by mouth every 12 hours for 10 doses 10 tablet 0    predniSONE (DELTASONE) 20 MG tablet Take 2 tablets by mouth daily for 3 doses 6 tablet 0    amLODIPine (NORVASC) 5 MG tablet Take 5 mg by mouth daily      citalopram (CELEXA) 40 MG tablet Take 40 mg by mouth daily      apixaban (ELIQUIS) 5 MG TABS tablet Take 5 mg by mouth 2 times daily      ferrous sulfate (IRON 325) 325 (65 Fe) MG tablet Take 325 mg by mouth daily (with breakfast)      losartan (COZAAR) 50 MG tablet Take 50 mg by mouth daily      omeprazole (PRILOSEC) 20 MG delayed release capsule Take 20 mg by mouth as needed      rOPINIRole (REQUIP) 1 MG tablet Take 1 mg by mouth 2 times daily      diazePAM (VALIUM) 5 MG tablet Take 5 mg by mouth as needed for Anxiety.  Indications: 60 mins prior to injection therapy      gabapentin (NEURONTIN) 300 MG capsule Take 300 mg by mouth as needed.  Cholecalciferol (VITAMIN D3) 1.25 MG (13457 UT) CAPS Take by mouth as needed       No current facility-administered medications on file prior to encounter. Prior to Admission medications    Medication Sig Start Date End Date Taking? Authorizing Provider   albuterol sulfate HFA (VENTOLIN HFA) 108 (90 Base) MCG/ACT inhaler Inhale 2 puffs into the lungs 4 times daily as needed for Wheezing 7/8/22   Martin Epley Orlop, DO   cefUROXime (CEFTIN) 500 MG tablet Take 1 tablet by mouth every 12 hours for 10 doses 7/8/22 7/13/22  Martin Epley Orlop, DO   predniSONE (DELTASONE) 20 MG tablet Take 2 tablets by mouth daily for 3 doses 7/9/22 7/12/22  Thomas Camara DO   amLODIPine (NORVASC) 5 MG tablet Take 5 mg by mouth daily    Historical Provider, MD   citalopram (CELEXA) 40 MG tablet Take 40 mg by mouth daily    Historical Provider, MD   apixaban (ELIQUIS) 5 MG TABS tablet Take 5 mg by mouth 2 times daily    Historical Provider, MD   ferrous sulfate (IRON 325) 325 (65 Fe) MG tablet Take 325 mg by mouth daily (with breakfast)    Historical Provider, MD   losartan (COZAAR) 50 MG tablet Take 50 mg by mouth daily    Historical Provider, MD   omeprazole (PRILOSEC) 20 MG delayed release capsule Take 20 mg by mouth as needed    Historical Provider, MD   rOPINIRole (REQUIP) 1 MG tablet Take 1 mg by mouth 2 times daily    Historical Provider, MD   diazePAM (VALIUM) 5 MG tablet Take 5 mg by mouth as needed for Anxiety. Indications: 60 mins prior to injection therapy    Historical Provider, MD   gabapentin (NEURONTIN) 300 MG capsule Take 300 mg by mouth as needed. Historical Provider, MD   Cholecalciferol (VITAMIN D3) 1.25 MG (37225 UT) CAPS Take by mouth as needed    Historical Provider, MD       This is a 66 y.o. female who is  pleasant, cooperative, alert and oriented x3, in no acute distress.     Heart: Heart regular rate and rhythm Lungs:clear to auscultation without wheezes or rales    Abdomen: soft, nontender, nondistended, no masses or organomegaly. Recent Labs     06/30/22  0000   COVID19 Detected*       SCOTT Castro CNP  Electronically signed 7/12/2022 at 8:37 AM     SCOTT Pena CNP   Nurse Practitioner   Oncology   Consults      Attested   Date of Service:  7/8/2022  9:56 AM         Related encounter: ED to Hosp-Admission (Discharged) from 7/6/2022 in [address]           Attested        Attestation signed by Lacie Sarkar MD at 7/8/2022  5:51 PM   Case discussed with nurse practitioner. She is in isolation for COVID 19 infection. I did not physically assess or examine her. Chart reviewed. Input from other services noted. Patient has significant leukopenia neutropenia. This was noted by her primary oncologist as an outpatient in the last few months. There is already a plan in place for bone marrow biopsy. I agree with this. We will try to facilitate the bone marrow biopsy at the earliest possible convenience. Otherwise, defer discharge planning other services. Continue supportive care as per other services. Call with questions. Expand All Collapse All          Show:Clear all  [x]Manual[x]Template[x]Copied    Added by:  [x]SCOTT Roe CNP      []Velia for details    [facility] Hematology and Oncology - Consult Note  7/8/2022, 9:56 AM     Admit Date: 7/6/2022  Referring Physician: Denyce Dakin, DO   PCP: Rob Helms MD     Reason for Consult: Neutropenia, breast cancer. Consideration for Neupogen     History of Present Illness:   66year old female with history of HTN, peripheral neuropathy, arthritis, GERD, HTN, cataracts, had MRI 2020  that showed disc disease however, did also show abnormal bone marrow uptake. Negative work up at that time.   Then in August of 2021 was diagnosed left sided breast cancer T2N0 ER + IN + [id number] negative, s/p left BREAST SURGERY        CHOLECYSTECTOMY        HYSTERECTOMY (CERVIX STATUS UNKNOWN)        JOINT REPLACEMENT Right 01/13/2020    LITHOTRIPSY        ROTATOR CUFF REPAIR Bilateral              Allergies: Allergies   Allergen Reactions    Crestor [Rosuvastatin]         myalgia    Lisinopril         cough    Pcn [Penicillins] Hives         Home Meds:    Prescriptions Prior to Admission   Medications Prior to Admission: azithromycin (ZITHROMAX) 500 MG tablet, Take 1 tablet by mouth daily for 5 days  benzonatate (TESSALON) 100 MG capsule, Take 1 capsule by mouth 3 times daily as needed for Cough  amLODIPine (NORVASC) 5 MG tablet, Take 5 mg by mouth daily  citalopram (CELEXA) 40 MG tablet, Take 40 mg by mouth daily  apixaban (ELIQUIS) 5 MG TABS tablet, Take 5 mg by mouth 2 times daily  ferrous sulfate (IRON 325) 325 (65 Fe) MG tablet, Take 325 mg by mouth daily (with breakfast)  losartan (COZAAR) 50 MG tablet, Take 50 mg by mouth daily  omeprazole (PRILOSEC) 20 MG delayed release capsule, Take 20 mg by mouth as needed  rOPINIRole (REQUIP) 1 MG tablet, Take 1 mg by mouth 2 times daily  diazePAM (VALIUM) 5 MG tablet, Take 5 mg by mouth as needed for Anxiety. Indications: 60 mins prior to injection therapy  gabapentin (NEURONTIN) 300 MG capsule, Take 300 mg by mouth as needed. Cholecalciferol (VITAMIN D3) 1.25 MG (63562 UT) CAPS, Take by mouth as needed         Social History:  Social History               Socioeconomic History    Marital status:         Spouse name: Not on file    Number of children: Not on file    Years of education: Not on file    Highest education level: Not on file   Occupational History    Not on file   Tobacco Use    Smoking status: Former Smoker    Smokeless tobacco: Never Used    Tobacco comment: quit 1991   Substance and Sexual Activity    Alcohol use: Not Currently    Drug use: Not Currently    Sexual activity: Not on file   Other Topics Concern    Not on file Social History Narrative    Not on file      Social Determinants of Health          Financial Resource Strain:     Difficulty of Paying Living Expenses: Not on file   Food Insecurity:     Worried About Running Out of Food in the Last Year: Not on file    Guillermo of Food in the Last Year: Not on file   Transportation Needs:     Lack of Transportation (Medical): Not on file    Lack of Transportation (Non-Medical): Not on file   Physical Activity:     Days of Exercise per Week: Not on file    Minutes of Exercise per Session: Not on file   Stress:     Feeling of Stress : Not on file   Social Connections:     Frequency of Communication with Friends and Family: Not on file    Frequency of Social Gatherings with Friends and Family: Not on file    Attends [employer] Services: Not on file    Active Member of [address] [place] [address] [employer] or Organizations: Not on file    Attends Club or Organization Meetings: Not on file    Marital Status: Not on file   Intimate Partner Violence:     Fear of Current or Ex-Partner: Not on file    Emotionally Abused: Not on file    Physically Abused: Not on file    Sexually Abused: Not on file   Housing Stability:     Unable to Pay for Housing in the Last Year: Not on file    Number of [place] in the Last Year: Not on file    Unstable Housing in the Last Year: Not on file            Review of Systems:   Review of Systems   Constitutional: Positive for appetite change and fatigue. Negative for fever. HENT: Negative for mouth sores and nosebleeds. Eyes: Negative for pain and visual disturbance. Respiratory: Positive for cough and shortness of breath. Gastrointestinal: Negative for abdominal distention, constipation and nausea. Genitourinary: Negative for dysuria and frequency. Musculoskeletal: Positive for arthralgias. Skin: Negative for pallor and rash. Neurological: Negative for dizziness and headaches. Psychiatric/Behavioral: Negative for behavioral problems. Physical Examination :   BP (!) 141/62   Pulse 83   Temp 97.3 °F (36.3 °C) (Oral)   Resp 16   Ht 5' 5\" (1.651 m)   Wt 202 lb 9.6 oz (91.9 kg)   SpO2 91%   BMI 33.71 kg/m²    Temperature Range: Temp: 97.3 °F (36.3 °C) Temp  Av.6 °F (36.4 °C)  Min: 97.3 °F (36.3 °C)  Max: 98.1 °F (36.7 °C)  Weight change:       Physical Exam   Patient not physically examined today due to COVID isolation status. Discussed with patient by phone. Discussed with RN and attending physician. Laboratory data:          Recent Labs     22  1606   WBC 1.0*   HGB 13.8   HCT 43.8      MCV 91.6   NEUTROABS 0.08*         No results for input(s): PROTIME, INR, PTT, DDIMER in the last 72 hours. Recent Labs     22  1606 22  0523   K 3.9 4.1   CL 98 101   CO2 28 28   BUN 17 17   CREATININE 0.72 0.76   CALCIUM 9.1 8.7         TSH: No results found for: TSH  VITAMIN B12: No results found for: [id number]  FOLATE:  No results found for: FOLATE  IRON: No results found for: IRON, TIBC, FERRITIN  FIBRINOGEN: No results found for: FIBRINOGEN      Imaging Studies:   XR CHEST PORTABLE     Result Date: 2022  Left lower lobe area of consolidative change on recent chest CT is not well visualized radiographically due to the overlying mediastinum. Lungs are otherwise clear. XR CHEST PORTABLE     Result Date: 2022  Unremarkable portable chest radiograph.          Medications:      Scheduled Medications    ipratropium-albuterol  1 ampule Inhalation BID    insulin lispro  0-6 Units SubCUTAneous TID WC    insulin lispro  0-3 Units SubCUTAneous Nightly    predniSONE  40 mg Oral Daily    famotidine  20 mg Oral BID    amLODIPine  5 mg Oral Daily    apixaban  5 mg Oral BID    citalopram  40 mg Oral Daily    ferrous sulfate  325 mg Oral Daily with breakfast    losartan  50 mg Oral Daily    rOPINIRole  1 mg Oral BID    sodium chloride flush  5-40 mL IntraVENous 2 times per day    cefTRIAXone (ROCEPHIN) IV  1,000 mg IntraVENous Q24H    azithromycin  500 mg IntraVENous Q24H         Desire Avelar, APRN - [address] Hematology and Oncology              Cosigned by: Susanna Beck MD at 7/8/2022  5:51 PM

## 2022-07-13 ENCOUNTER — CARE COORDINATION (OUTPATIENT)
Dept: CASE MANAGEMENT | Age: 72
End: 2022-07-13

## 2022-07-13 ENCOUNTER — NURSE ONLY (OUTPATIENT)
Dept: LAB | Age: 72
End: 2022-07-13

## 2022-07-13 NOTE — CARE COORDINATION
[facility] Transitions Follow Up Call    2022    Patient: Michael Arana  Patient : 1950   MRN: [id number]  Reason for Admission: COVID 19  Discharge Date: 22 RARS: Readmission Risk Score: 9.3 ( )         Spoke with: Ayla Fan, daughter    Spoke with patient's daughter who said she is doing pretty well today. She feels that her COVID symptoms are gone, still has some fatigue and BARKER but have improved some. Patient had BM biopsy done yesterday, taken from her hip, she is a little sore to the area but otherwise did well with the biopsy. The fatigue and BARKER daughter reports has been par for the course for her lately and she has no worsening symptoms. She has [facility] that follows, they deny any new needs or concerns at this time. Care Transitions Follow Up Call    Needs to be reviewed by the provider   Additional needs identified to be addressed with provider: No  none             Method of communication with provider : none      Care Transition Nurse contacted the family by telephone to follow up after admission on 22. Verified name and  with family as identifiers. Addressed changes since last contact: none  Discussed follow-up appointments. CTN reviewed discharge instructions, medical action plan and red flags with family and discussed any barriers to care and/or understanding of plan of care after discharge. Discussed appropriate site of care based on symptoms and resources available to patient including: PCP  Specialist. The family agrees to contact the PCP office for questions related to their healthcare. Patients top risk factors for readmission: medical condition-COVID  Interventions to address risk factors: Obtained and reviewed discharge summary and/or continuity of care documents      Non-[facility] follow up appointment(s): n/a    CTN provided contact information for future needs.  Plan for follow-up call in 7-10 days based on severity of symptoms and risk factors. Plan for next call: check if any further COVID type symptoms            Care Transitions Subsequent and Final Call    Schedule Follow Up Appointment with PCP: Completed  Subsequent and Final Calls  Do you have any ongoing symptoms?: Yes  Onset of Patient-reported symptoms: In the past 7 days  Patient-reported symptoms: Fatigue, Shortness of Breath  Have your medications changed?: No  Do you have any questions related to your medications?: No  Do you currently have any active services?: Yes  Are you currently active with any services?: Home Health  Do you have any needs or concerns that I can assist you with?: No  Identified Barriers: Lack of Education  Care Transitions Interventions  Other Interventions: Follow Up  No future appointments.     Ari Pickett RN

## 2022-07-14 LAB — BONE MARROW REPORT: NORMAL

## 2022-07-15 LAB — FLOW CYTOMETRY, BM: NORMAL

## 2022-07-18 LAB — CHROMOSOME STUDY: NORMAL

## 2022-07-20 ENCOUNTER — CARE COORDINATION (OUTPATIENT)
Dept: CASE MANAGEMENT | Age: 72
End: 2022-07-20

## 2022-07-20 NOTE — CARE COORDINATION
[facility] Transitions Follow Up Call    2022    Patient: Kaleb Carias  Patient : 1950   MRN: [id number]  Reason for Admission: COVID  Discharge Date: 22 RARS: Readmission Risk Score: 9.3 ( )         Spoke with: Patient    Spoke with patient who states she is having no further COVID symptoms however she had a bone marrow biopsy done last week and states today her doctor told her she has cancer and prognosis is very poor, she was given time of 5 weeks left. Patient is now with Hospice care, states she is doing ok for now, she is worried about her daughter but is in relatively good spirits today. She denies any needs at this time. Episode resolved. Care Transitions Subsequent and Final Call    Schedule Follow Up Appointment with PCP: Completed  Subsequent and Final Calls  Do you have any ongoing symptoms?: Yes  Onset of Patient-reported symptoms: In the past 7 days  Patient-reported symptoms: Cough  Have your medications changed?: Yes  Patient Reports: Patient now on Hospice care, medications managed per Hospice  Do you have any questions related to your medications?: No  Do you currently have any active services?: Yes  Are you currently active with any services?: Home Health  Do you have any needs or concerns that I can assist you with?: No  Identified Barriers: Stress, Lack of Education  Care Transitions Interventions  Other Interventions: Follow Up  No future appointments.     Mela Gibson RN

## 2024-07-31 NOTE — PROGRESS NOTES
Patient arrive via wheelchair for Cortrosyn stimulation testing. Peripheral IV established per policy. Medication administered. Phlebotomy staff requested to draw labs at 1110. Phlebotomy completed. Intact IV catheter removed with pressure dressing applied. Patient off unit via wheelchair at discharge.
[Initial] : an initial visit